# Patient Record
Sex: MALE | Race: WHITE | NOT HISPANIC OR LATINO | Employment: FULL TIME | URBAN - METROPOLITAN AREA
[De-identification: names, ages, dates, MRNs, and addresses within clinical notes are randomized per-mention and may not be internally consistent; named-entity substitution may affect disease eponyms.]

---

## 2017-01-31 ENCOUNTER — ALLSCRIPTS OFFICE VISIT (OUTPATIENT)
Dept: OTHER | Facility: OTHER | Age: 57
End: 2017-01-31

## 2017-04-19 ENCOUNTER — GENERIC CONVERSION - ENCOUNTER (OUTPATIENT)
Dept: OTHER | Facility: OTHER | Age: 57
End: 2017-04-19

## 2017-04-20 ENCOUNTER — GENERIC CONVERSION - ENCOUNTER (OUTPATIENT)
Dept: OTHER | Facility: OTHER | Age: 57
End: 2017-04-20

## 2017-04-20 LAB — INTERPRETATION (HISTORICAL): NORMAL

## 2017-05-24 ENCOUNTER — ALLSCRIPTS OFFICE VISIT (OUTPATIENT)
Dept: OTHER | Facility: OTHER | Age: 57
End: 2017-05-24

## 2018-01-12 VITALS
HEART RATE: 76 BPM | TEMPERATURE: 98.9 F | WEIGHT: 245 LBS | BODY MASS INDEX: 32.47 KG/M2 | HEIGHT: 73 IN | RESPIRATION RATE: 16 BRPM | DIASTOLIC BLOOD PRESSURE: 86 MMHG | SYSTOLIC BLOOD PRESSURE: 122 MMHG

## 2018-01-13 VITALS
BODY MASS INDEX: 33 KG/M2 | HEIGHT: 73 IN | DIASTOLIC BLOOD PRESSURE: 80 MMHG | RESPIRATION RATE: 16 BRPM | SYSTOLIC BLOOD PRESSURE: 126 MMHG | WEIGHT: 249 LBS | HEART RATE: 66 BPM | TEMPERATURE: 97.3 F

## 2018-01-15 NOTE — PROGRESS NOTES
Assessment    1  Abnormal glucose (790 29) (R73 09)   2  BMI 33 0-33 9,adult (V85 33) (Z68 33)   3  Encounter for preventive health examination (V70 0) (Z00 00)   4  Obesity (278 00) (E66 9)   5  Generalized osteoarthritis (715 00) (M15 9)   6  Colon cancer screening (V76 51) (Z12 11)    Plan  Abnormal glucose    · (1) COMPREHENSIVE METABOLIC PANEL; Status:Active; Requested for:37Ttc2228;    · (1) HEMOGLOBIN A1C; Status:Active; Requested for:24Hst2159; Abnormal glucose, Obesity    · (1) LIPID PANEL FASTING W DIRECT LDL REFLEX; Status:Active; Requested  for:78Ftt3997;   Obesity    · Begin a limited exercise program ; Status:Complete;   Done: 80TMZ1534   · Eat a low fat and low cholesterol diet ; Status:Complete;   Done: 55JVH1751   · Shared Decision Making Aid given; Status:Complete;   Done: 68YJD9901   · Some eating tips that can help you lose weight ; Status:Complete;   Done: 45RUV4648   · We recommend that you bring your body mass index down to 26 ; Status:Complete;    Done: 67OCA1285    Chief Complaint  pt present for CPE  ac/cma      History of Present Illness  HM, Adult Male: The patient is being seen for a health maintenance evaluation  General Health: The patient's health since the last visit is described as good  Immunizations status: up to date  Lifestyle:  He consumes a diverse and healthy diet  He has weight concerns  He does not use tobacco    Screening:      Review of Systems    Cardiovascular: no chest pain  Respiratory: no shortness of breath  Gastrointestinal: no abdominal pain and no blood in stools  Musculoskeletal: arthralgias and joint stiffness, but no limb swelling  Active Problems    1  Abnormal glucose (790 29) (R73 09)   2  Allergic rhinitis (477 9) (J30 9)   3  BMI 33 0-33 9,adult (V85 33) (Z68 33)   4  Colon cancer screening (V76 51) (Z12 11)   5  Depression screening (V79 0) (Z13 89)   6  Immunization due (V05 9) (Z23)   7  Obesity (278 00) (E66 9)   8   Prostate cancer screening (V76 44) (Z12 5)   9  Screening for diabetes mellitus (DM) (V77 1) (Z13 1)   10  Screening for lipid disorders (V77 91) (Z13 220)   11  Testicular hypogonadism (257 2) (E29 1)   12  Thyroid disorder screening (V77 0) (Z13 29)   13  Tinea cruris (110 3) (B35 6)   14  Well adult on routine health check (V70 0) (Z00 00)    Past Medical History    · History of Acute maxillary sinusitis, recurrence not specified (461 0) (J01 00)   · History of Blood pressure elevated (401 9) (I10)   · History of acute bronchitis (V12 69) (Z87 09)   · History of acute pharyngitis (V12 69) (Z87 09)   · History of acute sinusitis (V12 69) (Z87 09)   · History of Lyme disease (V12 09) (Z86 19)   · History of Insect bites, initial encounter (919 4,E906 4) (W57 XXXA)   · History of Multiple Nonvenomous Insect Bites (919 4)   · History of Screening for diabetes mellitus (DM) (V77 1) (Z13 1)   · History of Tinea corporis (110 5) (B35 4)    Surgical History    · History of Hip Surgery    Family History  Mother    · Family history of No Significant Family History  Father    · Family history of Coronary Artery Disease (V17 49)   · Family history of Hypertension (V17 49)  Family History    · Denied: Family history of Adverse Reaction To Anesthesia   · Denied: Family history of Benign Polyps Of The Large Intestine   · Denied: Family history of Bleeding   · Denied: Family history of Cancer   · Denied: Family history of Crohn's Disease   · Denied: Family history of Ulcerative Colitis    Social History    · Denied: History of Being A Social Drinker   · Denied: History of Drug Use   · Marital History - Currently    · Never A Smoker   · Occupation:   ·     Allergies    1   No Known Drug Allergies    Vitals   Recorded: 54JPJ1328 98:80AI   Systolic 088   Diastolic 80   Heart Rate 68   Respiration 16   Temperature 95 2 F   Height 6 ft 1 in   Weight 250 lb    BMI Calculated 32 98   BSA Calculated 2 36     Physical Exam    Constitutional General appearance: No acute distress, well appearing and well nourished  Eyes   Conjunctiva and lids: No erythema, swelling or discharge  Pupils and irises: Equal, round, reactive to light  Ophthalmoscopic examination: Normal fundi and optic discs  Ears, Nose, Mouth, and Throat   External inspection of ears and nose: Normal     Otoscopic examination: Tympanic membranes translucent with normal light reflex  Canals patent without erythema  Hearing: Normal     Nasal mucosa, septum, and turbinates: Normal without edema or erythema  Lips, teeth, and gums: Normal, good dentition  Oropharynx: Normal with no erythema, edema, exudate or lesions  Neck   Neck: Supple, symmetric, trachea midline, no masses  Thyroid: Normal, no thyromegaly  Pulmonary   Respiratory effort: No increased work of breathing or signs of respiratory distress  Auscultation of lungs: Clear to auscultation  Cardiovascular   Palpation of heart: Normal PMI, no thrills  Auscultation of heart: Normal rate and rhythm, normal S1 and S2, no murmurs  Carotid pulses: 2+ bilaterally  Pedal pulses: 2+ bilaterally  Examination of extremities for edema and/or varicosities: Normal     Abdomen   Abdomen: Non-tender, no masses  Liver and spleen: No hepatomegaly or splenomegaly  Examination for hernias: No hernias appreciated  Anus, perineum, and rectum: Normal sphincter tone, no masses, no prolapse  Genitourinary   Scrotal contents: Normal testes, no masses  Penis: Normal, no lesions  Digital rectal exam of prostate: Normal size, no masses  Lymphatic   Palpation of lymph nodes in neck: No lymphadenopathy  Palpation of lymph nodes in groin: No lymphadenopathy  Musculoskeletal   Gait and station: Normal     Inspection/palpation of digits and nails: Normal without clubbing or cyanosis      Inspection/palpation of joints, bones, and muscles: Normal     Range of motion: Abnormal   left knee stiffness  Stability: Normal     Muscle strength/tone: Normal     Skin   Skin and subcutaneous tissue: Normal without rashes or lesions  Palpation of skin and subcutaneous tissue: Normal turgor  Neurologic   Reflexes: 2+ and symmetric  Sensation: No sensory loss  Psychiatric   Mood and affect: Normal        Results/Data  PHQ-2 Adult Depression Screening [id number] 09:00PM Damaris Austin     Test Name Result Flag Reference   PHQ-2 Adult Depression Score 0     Over the last two weeks, how often have you been bothered by any of the following problems? Little interest or pleasure in doing things: Not at all - 0  Feeling down, depressed, or hopeless: Not at all - 0   PHQ-2 Adult Depression Screening Negative         Procedure    Procedure: Visual Acuity Test    Indication: routine screening  Inforrmation supplied by a Snellen chart     Results: 20/20 in both eyes with corrective device, 20/25 in the right eye with corrective device, 20/25 in the left eye with corrective device Pt forgot his new prescription glasses but had his old ones during the exam  ac/cma   Color vision was screened with the ANT VILLAGE Test and the results were normal       Provider Comments  Provider Comments:   labwork in 6m to see if needs tx or just monitoring      Signatures   Electronically signed by : Garry Duane, DO; Aug 30 2016  9:03PM EST                       (Author)

## 2018-01-16 NOTE — RESULT NOTES
Discussion/Summary   Sugar is in Diabetes range but kidneys, minerals and liver are normal   You are still under the cut-off to start medications for Diabetes at this time  Your cholesterol is high  Please schedule an appointment to discuss starting cholesterol medications for the purpose of reducing your 10-year risk of heart disease and/or stroke if you are willing    Dr Jessica Lozoya           Verified Results  (1) COMPREHENSIVE METABOLIC PANEL 61TTX7625 90:85TJ Luz Maria Pelt     Test Name Result Flag Reference   Glucose, Serum 133 mg/dL H 65-99   BUN 15 mg/dL  6-24   Creatinine, Serum 0 76 mg/dL  0 76-1 27   eGFR If NonAfricn Am 102 mL/min/1 73  >59   eGFR If Africn Am 118 mL/min/1 73  >59   BUN/Creatinine Ratio 20  9-20   Sodium, Serum 141 mmol/L  134-144   Potassium, Serum 4 8 mmol/L  3 5-5 2   Chloride, Serum 101 mmol/L     Carbon Dioxide, Total 24 mmol/L  18-29   Calcium, Serum 8 9 mg/dL  8 7-10 2   Protein, Total, Serum 6 5 g/dL  6 0-8 5   Albumin, Serum 4 4 g/dL  3 5-5 5   Globulin, Total 2 1 g/dL  1 5-4 5   A/G Ratio 2 1  1 2-2 2   Bilirubin, Total 0 5 mg/dL  0 0-1 2   Alkaline Phosphatase, S 51 IU/L     AST (SGOT) 17 IU/L  0-40   ALT (SGPT) 21 IU/L  0-44     (1) HEMOGLOBIN A1C 19Apr2017 07:39AM Luz Maria Pelt     Test Name Result Flag Reference   Hemoglobin A1c 6 6 % H 4 8-5 6   Pre-diabetes: 5 7 - 6 4           Diabetes: >6 4           Glycemic control for adults with diabetes: <7 0     (1) LIPID PANEL FASTING W DIRECT LDL REFLEX 19Apr2017 07:39AM Luz Maria Pelt     Test Name Result Flag Reference   Cholesterol, Total 188 mg/dL  100-199   Triglycerides 130 mg/dL  0-149   HDL Cholesterol 50 mg/dL  >39   LDL Cholesterol Calc 112 mg/dL H 0-99

## 2018-03-01 LAB
ALBUMIN SERPL-MCNC: 4.2 G/DL (ref 3.5–5.5)
ALBUMIN/GLOB SERPL: 2.1 {RATIO} (ref 1.2–2.2)
ALP SERPL-CCNC: 54 IU/L (ref 39–117)
ALT SERPL-CCNC: 25 IU/L (ref 0–44)
AST SERPL-CCNC: 24 IU/L (ref 0–40)
BILIRUB SERPL-MCNC: 0.5 MG/DL (ref 0–1.2)
BUN SERPL-MCNC: 14 MG/DL (ref 6–24)
BUN/CREAT SERPL: 17 (ref 9–20)
CALCIUM SERPL-MCNC: 9 MG/DL (ref 8.7–10.2)
CHLORIDE SERPL-SCNC: 100 MMOL/L (ref 96–106)
CHOLEST SERPL-MCNC: 191 MG/DL (ref 100–199)
CO2 SERPL-SCNC: 24 MMOL/L (ref 18–29)
CREAT SERPL-MCNC: 0.84 MG/DL (ref 0.76–1.27)
GLOBULIN SER-MCNC: 2 G/DL (ref 1.5–4.5)
GLUCOSE SERPL-MCNC: 163 MG/DL (ref 65–99)
HBA1C MFR BLD: 7.6 % (ref 4.8–5.6)
HDLC SERPL-MCNC: 45 MG/DL
LDLC SERPL CALC-MCNC: 116 MG/DL (ref 0–99)
MICRODELETION SYND BLD/T FISH: NORMAL
POTASSIUM SERPL-SCNC: 4.3 MMOL/L (ref 3.5–5.2)
PROT SERPL-MCNC: 6.2 G/DL (ref 6–8.5)
PSA SERPL-MCNC: 1.4 NG/ML (ref 0–4)
SL AMB EGFR AFRICAN AMERICAN: 112 ML/MIN/1.73
SL AMB EGFR NON AFRICAN AMERICAN: 97 ML/MIN/1.73
SODIUM SERPL-SCNC: 140 MMOL/L (ref 134–144)
TRIGL SERPL-MCNC: 149 MG/DL (ref 0–149)

## 2018-07-09 ENCOUNTER — OFFICE VISIT (OUTPATIENT)
Dept: FAMILY MEDICINE CLINIC | Facility: CLINIC | Age: 58
End: 2018-07-09
Payer: COMMERCIAL

## 2018-07-09 VITALS
WEIGHT: 242 LBS | DIASTOLIC BLOOD PRESSURE: 80 MMHG | SYSTOLIC BLOOD PRESSURE: 130 MMHG | RESPIRATION RATE: 18 BRPM | BODY MASS INDEX: 30.09 KG/M2 | TEMPERATURE: 97.2 F | HEIGHT: 75 IN | HEART RATE: 76 BPM

## 2018-07-09 DIAGNOSIS — K29.00 ACUTE GASTRITIS, PRESENCE OF BLEEDING UNSPECIFIED, UNSPECIFIED GASTRITIS TYPE: Primary | ICD-10-CM

## 2018-07-09 DIAGNOSIS — R73.09 ABNORMAL GLUCOSE: ICD-10-CM

## 2018-07-09 PROCEDURE — 99213 OFFICE O/P EST LOW 20 MIN: CPT | Performed by: FAMILY MEDICINE

## 2018-07-09 PROCEDURE — 1036F TOBACCO NON-USER: CPT | Performed by: FAMILY MEDICINE

## 2018-07-09 PROCEDURE — 3008F BODY MASS INDEX DOCD: CPT | Performed by: FAMILY MEDICINE

## 2018-07-09 RX ORDER — OMEPRAZOLE 40 MG/1
40 CAPSULE, DELAYED RELEASE ORAL DAILY
Qty: 90 CAPSULE | Refills: 0 | Status: SHIPPED | OUTPATIENT
Start: 2018-07-09 | End: 2019-02-08

## 2018-07-09 RX ORDER — ELECTROLYTES/DEXTROSE
1 SOLUTION, ORAL ORAL DAILY
COMMUNITY
Start: 2014-12-03 | End: 2019-05-08 | Stop reason: ALTCHOICE

## 2018-07-09 NOTE — PROGRESS NOTES
Assessment/Plan:    Problem List Items Addressed This Visit     Abnormal glucose     He has lost weight and would like his sugar rechecked  He is trying to avoid medication  Relevant Orders    Comprehensive metabolic panel    Hemoglobin A1C      Other Visit Diagnoses     Acute gastritis, presence of bleeding unspecified, unspecified gastritis type    -  Primary    new, if he doesn't improve will follow up with GI    Relevant Medications    omeprazole (PriLOSEC) 40 MG capsule          Patient Instructions   Follow up with gastroenterologist if not improving      Return if symptoms worsen or fail to improve  Subjective:      Patient ID: Maxime Diop is a 62 y o  male  Chief Complaint   Patient presents with    Abdominal Pain     Since Thursday 07/05/2018  regular BM's  abdomen feels bloated  nauseated no vomiting   pain level up to 8 at times  rmklpn       He has been having upper abdominal pain for the past few days  The pain has woken him up at night  The pain has been intermittent  Then the today the pain is worse  He describes it as an ache  He tried an [address] with no relief  He was told 30 years ago that he had a hiatal hernia  He took a course of Prilosec and felt better  He has been eating less and eating healthier  The following portions of the patient's history were reviewed and updated as appropriate:  past social history    Review of Systems   Constitutional: Negative for fever  Gastrointestinal: Positive for abdominal pain and nausea  Negative for blood in stool, constipation and diarrhea  Current Outpatient Prescriptions   Medication Sig Dispense Refill    Multiple Vitamins-Minerals (MULTIVITAMIN ADULT) TABS Take 1 tablet by mouth daily      omeprazole (PriLOSEC) 40 MG capsule Take 1 capsule (40 mg total) by mouth daily 90 capsule 0     No current facility-administered medications for this visit          Objective:    /80   Pulse 76   Temp (!) 97 2 °F (36 2 °C)   Resp 18   Ht 6' 2 5" (1 892 m)   Wt 110 kg (242 lb)   BMI 30 66 kg/m²        Physical Exam   Constitutional: He appears well-developed and well-nourished  HENT:   Head: Normocephalic and atraumatic  Right Ear: External ear normal    Left Ear: External ear normal    Mouth/Throat: Oropharynx is clear and moist    Cardiovascular: Normal rate, regular rhythm and normal heart sounds  No murmur heard  Pulmonary/Chest: Effort normal and breath sounds normal  No respiratory distress  He has no wheezes  He has no rales  Abdominal: Soft  Bowel sounds are normal  There is tenderness (epigastric)  There is no rebound and no guarding  Musculoskeletal: He exhibits no edema or tenderness  Nursing note and vitals reviewed               Saba Grace DO

## 2019-02-06 ENCOUNTER — TELEPHONE (OUTPATIENT)
Dept: FAMILY MEDICINE CLINIC | Facility: CLINIC | Age: 59
End: 2019-02-06

## 2019-02-06 LAB
ALBUMIN SERPL-MCNC: 4.4 G/DL (ref 3.5–5.5)
ALBUMIN/GLOB SERPL: 2 {RATIO} (ref 1.2–2.2)
ALP SERPL-CCNC: 49 IU/L (ref 39–117)
ALT SERPL-CCNC: 28 IU/L (ref 0–44)
AST SERPL-CCNC: 22 IU/L (ref 0–40)
BILIRUB SERPL-MCNC: 0.3 MG/DL (ref 0–1.2)
BUN SERPL-MCNC: 17 MG/DL (ref 6–24)
BUN/CREAT SERPL: 21 (ref 9–20)
CALCIUM SERPL-MCNC: 9.4 MG/DL (ref 8.7–10.2)
CHLORIDE SERPL-SCNC: 103 MMOL/L (ref 96–106)
CO2 SERPL-SCNC: 26 MMOL/L (ref 20–29)
CREAT SERPL-MCNC: 0.81 MG/DL (ref 0.76–1.27)
GLOBULIN SER-MCNC: 2.2 G/DL (ref 1.5–4.5)
GLUCOSE SERPL-MCNC: 140 MG/DL (ref 65–99)
HBA1C MFR BLD: 7 % (ref 4.8–5.6)
LABCORP COMMENT: NORMAL
POTASSIUM SERPL-SCNC: 4.3 MMOL/L (ref 3.5–5.2)
PROT SERPL-MCNC: 6.6 G/DL (ref 6–8.5)
SL AMB EGFR AFRICAN AMERICAN: 113 ML/MIN/1.73
SL AMB EGFR NON AFRICAN AMERICAN: 98 ML/MIN/1.73
SODIUM SERPL-SCNC: 142 MMOL/L (ref 134–144)

## 2019-02-06 NOTE — TELEPHONE ENCOUNTER
----- Message from Mitch Piedra DO sent at 2/6/2019  1:19 PM EST -----  Please ask him to schedule a follow up appointment to discuss his lab work with either me or Dr Eldon Piedra DO

## 2019-02-08 ENCOUNTER — OFFICE VISIT (OUTPATIENT)
Dept: FAMILY MEDICINE CLINIC | Facility: CLINIC | Age: 59
End: 2019-02-08
Payer: COMMERCIAL

## 2019-02-08 VITALS
HEIGHT: 75 IN | SYSTOLIC BLOOD PRESSURE: 130 MMHG | TEMPERATURE: 96.4 F | HEART RATE: 60 BPM | DIASTOLIC BLOOD PRESSURE: 90 MMHG | BODY MASS INDEX: 31.31 KG/M2 | WEIGHT: 251.8 LBS | RESPIRATION RATE: 18 BRPM

## 2019-02-08 DIAGNOSIS — Z12.5 PROSTATE CANCER SCREENING: ICD-10-CM

## 2019-02-08 DIAGNOSIS — Z13.1 SCREENING FOR DIABETES MELLITUS (DM): ICD-10-CM

## 2019-02-08 DIAGNOSIS — Z13.89 SCREENING FOR CARDIOVASCULAR, RESPIRATORY, AND GENITOURINARY DISEASES: ICD-10-CM

## 2019-02-08 DIAGNOSIS — Z00.00 HEALTHCARE MAINTENANCE: Primary | ICD-10-CM

## 2019-02-08 DIAGNOSIS — Z13.83 SCREENING FOR CARDIOVASCULAR, RESPIRATORY, AND GENITOURINARY DISEASES: ICD-10-CM

## 2019-02-08 DIAGNOSIS — Z13.6 SCREENING FOR CARDIOVASCULAR, RESPIRATORY, AND GENITOURINARY DISEASES: ICD-10-CM

## 2019-02-08 DIAGNOSIS — R73.09 ABNORMAL GLUCOSE: ICD-10-CM

## 2019-02-08 PROCEDURE — 99396 PREV VISIT EST AGE 40-64: CPT | Performed by: FAMILY MEDICINE

## 2019-02-08 NOTE — PROGRESS NOTES
Assessment/Plan:    No problem-specific Assessment & Plan notes found for this encounter  cpe today  bmi advised  DM criteria reviewed, he will work hard to see if he can avoid medication in the near future     Diagnoses and all orders for this visit:    Healthcare maintenance    Screening for cardiovascular, respiratory, and genitourinary diseases  -     Lipid Panel with Direct LDL reflex; Future    Screening for diabetes mellitus (DM)    Prostate cancer screening  -     PSA, Total Screen; Future    Abnormal glucose  -     Comprehensive metabolic panel; Future  -     Hemoglobin A1C; Future        BMI Counseling: Body mass index is 31 9 kg/m²  Discussed the patient's BMI with him  The BMI is above average  BMI counseling and education was provided to the patient  Nutrition recommendations include decreasing overall calorie intake  Return if symptoms worsen or fail to improve  Subjective:      Patient ID: Sol Neil is a 62 y o  male  Chief Complaint   Patient presents with    Follow-up     on blood work akrma        HPI    Labs reviewed  Glucose 140  a1c 7 0  Works a   No exercise  Can do more with diet    The following portions of the patient's history were reviewed and updated as appropriate: allergies, current medications, past family history, past medical history, past social history, past surgical history and problem list     Review of Systems   Respiratory: Negative for shortness of breath  Cardiovascular: Negative for chest pain  Neurological: Negative for dizziness  Current Outpatient Prescriptions   Medication Sig Dispense Refill    Multiple Vitamins-Minerals (MULTIVITAMIN ADULT) TABS Take 1 tablet by mouth daily       No current facility-administered medications for this visit          Objective:    /90   Pulse 60   Temp (!) 96 4 °F (35 8 °C)   Resp 18   Ht 6' 2 5" (1 892 m)   Wt 114 kg (251 lb 12 8 oz)   BMI 31 90 kg/m²        Physical Exam Constitutional: He appears well-developed  No distress  HENT:   Head: Normocephalic  Mouth/Throat: No oropharyngeal exudate  Eyes: Conjunctivae are normal  No scleral icterus  Neck: Neck supple  No thyromegaly present  Cardiovascular: Normal rate and intact distal pulses  No murmur heard  Pulmonary/Chest: Effort normal  No respiratory distress  He has no wheezes  He has no rales  Abdominal: Soft  He exhibits no distension and no mass  There is no tenderness  There is no rebound and no guarding  Hernia confirmed negative in the right inguinal area and confirmed negative in the left inguinal area  Genitourinary: Prostate normal and penis normal  No penile tenderness  Musculoskeletal: He exhibits no edema or deformity  Lymphadenopathy:     He has no cervical adenopathy  Neurological: He is alert  He displays normal reflexes  He exhibits normal muscle tone  Skin: Skin is warm and dry  No rash noted  No pallor  Psychiatric: His behavior is normal  Thought content normal    Nursing note and vitals reviewed               Moreno Hart DO

## 2019-04-14 ENCOUNTER — HOSPITAL ENCOUNTER (EMERGENCY)
Facility: HOSPITAL | Age: 59
Discharge: HOME | End: 2019-04-14
Attending: EMERGENCY MEDICINE | Admitting: EMERGENCY MEDICINE
Payer: COMMERCIAL

## 2019-04-14 ENCOUNTER — APPOINTMENT (EMERGENCY)
Dept: RADIOLOGY | Facility: HOSPITAL | Age: 59
End: 2019-04-14
Payer: COMMERCIAL

## 2019-04-14 VITALS
RESPIRATION RATE: 18 BRPM | SYSTOLIC BLOOD PRESSURE: 140 MMHG | DIASTOLIC BLOOD PRESSURE: 70 MMHG | TEMPERATURE: 98.8 F | OXYGEN SATURATION: 98 % | HEART RATE: 76 BPM

## 2019-04-14 DIAGNOSIS — L02.415 ABSCESS OF RIGHT KNEE: Primary | ICD-10-CM

## 2019-04-14 PROCEDURE — 99283 EMERGENCY DEPT VISIT LOW MDM: CPT

## 2019-04-14 PROCEDURE — 87070 CULTURE OTHR SPECIMN AEROBIC: CPT | Performed by: EMERGENCY MEDICINE

## 2019-04-14 PROCEDURE — 73564 X-RAY EXAM KNEE 4 OR MORE: CPT

## 2019-04-14 PROCEDURE — 87205 SMEAR GRAM STAIN: CPT | Performed by: EMERGENCY MEDICINE

## 2019-04-14 RX ORDER — SULFAMETHOXAZOLE AND TRIMETHOPRIM 800; 160 MG/1; MG/1
1 TABLET ORAL 2 TIMES DAILY
Qty: 14 TABLET | Refills: 0 | Status: SHIPPED | OUTPATIENT
Start: 2019-04-14 | End: 2019-04-21

## 2019-04-14 RX ORDER — LIDOCAINE HYDROCHLORIDE AND EPINEPHRINE 20; 5 MG/ML; UG/ML
10 INJECTION, SOLUTION EPIDURAL; INFILTRATION; INTRACAUDAL; PERINEURAL ONCE
Status: COMPLETED | OUTPATIENT
Start: 2019-04-14 | End: 2019-04-14

## 2019-04-14 RX ORDER — SULFAMETHOXAZOLE AND TRIMETHOPRIM 800; 160 MG/1; MG/1
1 TABLET ORAL ONCE
Status: COMPLETED | OUTPATIENT
Start: 2019-04-14 | End: 2019-04-14

## 2019-04-14 RX ADMIN — LIDOCAINE HYDROCHLORIDE,EPINEPHRINE BITARTRATE 10 ML: 20; .005 INJECTION, SOLUTION EPIDURAL; INFILTRATION; INTRACAUDAL; PERINEURAL at 09:06

## 2019-04-14 RX ADMIN — SULFAMETHOXAZOLE AND TRIMETHOPRIM 1 TABLET: 800; 160 TABLET ORAL at 09:57

## 2019-04-15 ENCOUNTER — VBI (OUTPATIENT)
Dept: FAMILY MEDICINE CLINIC | Facility: CLINIC | Age: 59
End: 2019-04-15

## 2019-04-16 LAB
BACTERIA WND AEROBE CULT: ABNORMAL
GRAM STN SPEC: ABNORMAL
GRAM STN SPEC: ABNORMAL

## 2019-05-08 ENCOUNTER — OFFICE VISIT (OUTPATIENT)
Dept: FAMILY MEDICINE CLINIC | Facility: CLINIC | Age: 59
End: 2019-05-08
Payer: COMMERCIAL

## 2019-05-08 VITALS
HEART RATE: 82 BPM | SYSTOLIC BLOOD PRESSURE: 130 MMHG | TEMPERATURE: 96.5 F | HEIGHT: 74 IN | BODY MASS INDEX: 31.44 KG/M2 | RESPIRATION RATE: 16 BRPM | WEIGHT: 245 LBS | DIASTOLIC BLOOD PRESSURE: 78 MMHG

## 2019-05-08 DIAGNOSIS — L03.115 CELLULITIS OF RIGHT LOWER EXTREMITY: Primary | ICD-10-CM

## 2019-05-08 PROBLEM — M51.26 DISPLACEMENT OF LUMBAR INTERVERTEBRAL DISC WITHOUT MYELOPATHY: Status: ACTIVE | Noted: 2019-05-08

## 2019-05-08 PROBLEM — M54.50 LOW BACK PAIN: Status: ACTIVE | Noted: 2019-05-08

## 2019-05-08 PROBLEM — M16.9 OSTEOARTHRITIS OF HIP: Status: ACTIVE | Noted: 2019-05-08

## 2019-05-08 PROBLEM — M16.10 PRIMARY LOCALIZED OSTEOARTHRITIS OF PELVIC REGION AND THIGH: Status: ACTIVE | Noted: 2019-05-08

## 2019-05-08 PROBLEM — M54.17 LUMBOSACRAL RADICULITIS: Status: ACTIVE | Noted: 2019-05-08

## 2019-05-08 PROCEDURE — 99213 OFFICE O/P EST LOW 20 MIN: CPT | Performed by: FAMILY MEDICINE

## 2019-05-08 PROCEDURE — 3008F BODY MASS INDEX DOCD: CPT | Performed by: FAMILY MEDICINE

## 2019-05-08 PROCEDURE — 1036F TOBACCO NON-USER: CPT | Performed by: FAMILY MEDICINE

## 2019-05-08 RX ORDER — SULFAMETHOXAZOLE AND TRIMETHOPRIM 800; 160 MG/1; MG/1
1 TABLET ORAL 2 TIMES DAILY
Qty: 40 TABLET | Refills: 0 | Status: SHIPPED | OUTPATIENT
Start: 2019-05-08 | End: 2019-05-28

## 2019-05-08 RX ORDER — CEPHALEXIN 500 MG/1
CAPSULE ORAL
COMMUNITY
End: 2019-05-08 | Stop reason: ALTCHOICE

## 2019-05-08 RX ORDER — OMEPRAZOLE 20 MG/1
20 CAPSULE, DELAYED RELEASE ORAL DAILY
Refills: 0 | COMMUNITY
Start: 2019-03-30 | End: 2019-05-08 | Stop reason: ALTCHOICE

## 2020-07-30 ENCOUNTER — APPOINTMENT (EMERGENCY)
Dept: RADIOLOGY | Facility: HOSPITAL | Age: 60
End: 2020-07-30
Payer: COMMERCIAL

## 2020-07-30 ENCOUNTER — HOSPITAL ENCOUNTER (EMERGENCY)
Facility: HOSPITAL | Age: 60
Discharge: HOME | End: 2020-07-30
Attending: EMERGENCY MEDICINE | Admitting: EMERGENCY MEDICINE
Payer: COMMERCIAL

## 2020-07-30 VITALS
WEIGHT: 248 LBS | RESPIRATION RATE: 18 BRPM | DIASTOLIC BLOOD PRESSURE: 88 MMHG | OXYGEN SATURATION: 98 % | BODY MASS INDEX: 31.84 KG/M2 | SYSTOLIC BLOOD PRESSURE: 139 MMHG | HEART RATE: 64 BPM | TEMPERATURE: 96.8 F
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
## 2020-07-30 LAB
BILIRUB UR QL STRIP: NEGATIVE
CLARITY UR: CLEAR
COLOR UR: ABNORMAL
GLUCOSE UR STRIP-MCNC: ABNORMAL MG/DL
HGB UR QL STRIP.AUTO: NEGATIVE
KETONES UR STRIP-MCNC: NEGATIVE MG/DL
LEUKOCYTE ESTERASE UR QL STRIP: NEGATIVE
NITRITE UR QL STRIP: NEGATIVE
PH UR STRIP.AUTO: 6 [PH]
PROT UR STRIP-MCNC: NEGATIVE MG/DL
SP GR UR STRIP.AUTO: <=1.005 (ref 1–1.03)
UROBILINOGEN UR QL STRIP.AUTO: 0.2 E.U./DL

## 2020-07-30 PROCEDURE — 81003 URINALYSIS AUTO W/O SCOPE: CPT | Performed by: EMERGENCY MEDICINE

## 2020-07-30 PROCEDURE — 74177 CT ABD & PELVIS W/CONTRAST: CPT

## 2020-07-30 PROCEDURE — 99284 EMERGENCY DEPT VISIT MOD MDM: CPT | Performed by: EMERGENCY MEDICINE

## 2020-07-30 PROCEDURE — 99284 EMERGENCY DEPT VISIT MOD MDM: CPT

## 2020-07-30 PROCEDURE — 73502 X-RAY EXAM HIP UNI 2-3 VIEWS: CPT

## 2020-07-30 RX ORDER — GINSENG 100 MG
1 CAPSULE ORAL ONCE
Status: COMPLETED | OUTPATIENT
Start: 2020-07-30 | End: 2020-07-30

## 2020-07-30 RX ADMIN — IOHEXOL 100 ML: 350 INJECTION, SOLUTION INTRAVENOUS at 19:13

## 2020-07-30 RX ADMIN — BACITRACIN 1 SMALL APPLICATION: 500 OINTMENT TOPICAL at 18:07

## 2020-07-30 NOTE — ED PROVIDER NOTES
History  Chief Complaint   Patient presents with   Itzel Karimetamika states he was about 3/4 up an eight foot ladder(backwards on the ladder) when ladder kicked out and he fell on top of the ladder, has pain in middle of back , right hip and states has a cut on right foot unable to visualize at triage     77-year-old male states he was about 3/4 way up in a for ladder and he fell backwards off the ladder when the ladder fell he landed on the ladder on his right hip  Patient states he feels he tried to twist himself around before hitting and now feels some tenderness and pain in the left flank region also  States that pain radiates around to the front feels that it is either a lower rib or muscles in his back  No loss of consciousness no neck pain no chest abdomen pain  Patient does have some tenderness around the right hip  But he does have full range of motion prior to me getting in the room without hip  Patient also sustained a small superficial laceration in the area of the right Achilles tendon which did not affect function of the tendon but does have abrasion small amount of bleeding to that area  No other complaints      History provided by:  Patient and spouse   used: No        None       History reviewed  No pertinent past medical history  Past Surgical History:   Procedure Laterality Date    HIP SURGERY Right        History reviewed  No pertinent family history  I have reviewed and agree with the history as documented  E-Cigarette/Vaping    E-Cigarette Use Never User      E-Cigarette/Vaping Substances     Social History     Tobacco Use    Smoking status: Never Smoker    Smokeless tobacco: Never Used   Substance Use Topics    Alcohol use: Yes     Alcohol/week: 1 0 standard drinks     Types: 1 Cans of beer per week     Binge frequency: Monthly    Drug use: No       Review of Systems   Constitutional: Negative for activity change, chills, diaphoresis and fever     HENT: Negative for congestion, ear pain, nosebleeds, sore throat, trouble swallowing and voice change  Eyes: Negative for pain, discharge and redness  Respiratory: Negative for apnea, cough, choking, shortness of breath, wheezing and stridor  Cardiovascular: Negative for chest pain and palpitations  Gastrointestinal: Negative for abdominal distention, abdominal pain, constipation, diarrhea, nausea and vomiting  Endocrine: Negative for polydipsia  Genitourinary: Negative for difficulty urinating, dysuria, flank pain, frequency, hematuria and urgency  Musculoskeletal: Positive for arthralgias and back pain  Negative for gait problem, joint swelling, myalgias, neck pain and neck stiffness  Skin: Negative for pallor and rash  Neurological: Negative for dizziness, tremors, syncope, speech difficulty, weakness, numbness and headaches  Hematological: Negative for adenopathy  Psychiatric/Behavioral: Negative for confusion, hallucinations, self-injury and suicidal ideas  The patient is not nervous/anxious  Physical Exam  Physical Exam   Constitutional: He is oriented to person, place, and time  Vital signs are normal  He appears well-developed and well-nourished  No distress  HENT:   Head: Normocephalic and atraumatic  Right Ear: External ear normal    Left Ear: External ear normal    Nose: Nose normal    Mouth/Throat: Oropharynx is clear and moist    Eyes: Pupils are equal, round, and reactive to light  Conjunctivae are normal    Neck: Normal range of motion  Neck supple  Cardiovascular: Normal rate, regular rhythm, normal heart sounds and intact distal pulses  Pulmonary/Chest: Effort normal and breath sounds normal    Abdominal: Soft  Bowel sounds are normal    Musculoskeletal: Normal range of motion  He exhibits tenderness  Patient has tenderness and the right ankle from the superficial laceration over the Achilles    Patient also some tenderness in the right hip but does have range of motion there  Patient also has pain tenderness to palpation in the left flank along the upper lumbar lower thoracic paravertebral musculature however does radiate around to the front of the abdomen therefore going to CT scan his abdomen and pelvis  Neurological: He is alert and oriented to person, place, and time  He has normal reflexes  Skin: Skin is warm and dry  He is not diaphoretic  Psychiatric: He has a normal mood and affect  Nursing note and vitals reviewed  Vital Signs  ED Triage Vitals [07/30/20 1724]   Temperature Pulse Respirations Blood Pressure SpO2   (!) 96 8 °F (36 °C) 68 18 143/81 97 %      Temp Source Heart Rate Source Patient Position - Orthostatic VS BP Location FiO2 (%)   Tympanic Monitor Sitting Right arm --      Pain Score       --           Vitals:    07/30/20 1800 07/30/20 1815 07/30/20 1830 07/30/20 1930   BP:  134/68  139/88   Pulse: 66 68 82 64   Patient Position - Orthostatic VS:    Sitting         Visual Acuity      ED Medications  Medications   bacitracin topical ointment 1 small application (1 small application Topical Given 7/30/20 1807)   iohexol (OMNIPAQUE) 350 MG/ML injection (MULTI-DOSE) 100 mL (100 mL Intravenous Given 7/30/20 1913)       Diagnostic Studies  Results Reviewed     Procedure Component Value Units Date/Time    UA (URINE) with reflex to Scope [[id number]]  (Abnormal) Collected:  07/30/20 1927    Lab Status:  Final result Specimen:  Urine, Other Updated:  07/30/20 1933     Color, UA Light Yellow     Clarity, UA Clear     Specific Gravity, UA <=1 005     pH, UA 6 0     Leukocytes, UA Negative     Nitrite, UA Negative     Protein, UA Negative mg/dl      Glucose,  (1/10%) mg/dl      Ketones, UA Negative mg/dl      Urobilinogen, UA 0 2 E U /dl      Bilirubin, UA Negative     Blood, UA Negative                 CT abdomen pelvis with contrast   Final Result by Aysha Diallo MD (07/30 1943)      No acute abnormality within the abdomen or pelvis  Workstation performed: [id number]         XR hip/pelv 2-3 vws right    (Results Pending)              Procedures  Procedures         ED Course       US AUDIT      Most Recent Value   Initial Alcohol Screen: US AUDIT-C    1  How often do you have a drink containing alcohol? 2 Filed at: 07/30/2020 1725   2  How many drinks containing alcohol do you have on a typical day you are drinking? 0 Filed at: 07/30/2020 1725   Audit-C Score  2 Filed at: 07/30/2020 1725                  RAMANDEEP/DAST-10      Most Recent Value   How many times in the past year have you    Used an illegal drug or used a prescription medication for non-medical reasons? Never Filed at: 07/30/2020 1725                                MDM      Disposition  Final diagnoses:   Fall, initial encounter     Time reflects when diagnosis was documented in both MDM as applicable and the Disposition within this note     Time User Action Codes Description Comment    7/30/2020  7:59 PM Quynh Walls Add [V45  Jess Gaines, initial encounter       ED Disposition     ED Disposition Condition Date/Time Comment    Discharge Stable Thu Jul 30, 2020  7:59 PM Emerson Storey discharge to home/self care  Follow-up Information     Follow up With Specialties Details Why [address] [facility],  Family Medicine Schedule an appointment as soon as possible for a visit  As needed [address]  [phone]            Patient's Medications    No medications on file     No discharge procedures on file      PDMP Review     None          ED Provider  Electronically Signed by           Ximena Gloria DO  07/30/20 2000

## 2020-07-31 ENCOUNTER — VBI (OUTPATIENT)
Dept: FAMILY MEDICINE CLINIC | Facility: CLINIC | Age: 60
End: 2020-07-31

## 2020-07-31 NOTE — LETTER
[facility]  [address] [facility]  [address]  [place] [address]    Date: 08/03/20  Tess Hamlin  [address] [address]    Dear Jose Juan Pierce:                                                                                                                              Thank you for choosing [facility] emergency department for care  As your primary care provider we want to make sure that your ongoing medical care is being addressed  If you require follow up care as a result of your emergency department visit, there are a few things we would like you to know  As part of our continuing commitment to caring for our patient, we have added more same day appointments and have extended our office hours to meet your medical needs  After hours, on-call physicians are available via our main office line  We encourage you to contact our office prior to seeking treatment to discuss your symptoms with our medical staff  Together, we can determine the correct course of action  A majority of non-emergent conditions such as: common cold, flu-like symptoms, fevers, strains/sprains, dislocations, minor burns, cuts and animal bites can be treated at [facility] facilities  Diagnostic testing is available at some sites  Of course, if you are experiencing a life threatening medical emergency call 911 or proceed directly to the nearest emergency room      Your nearest [facility] facility is conveniently located at:    [facility], [facility]    [phone]    SKIP THE WAIT  Conveniently offered at most [facility] locations  Cynthiafort your spot online at www [facility] [email or web address] or on the [facility] 67    Sincerely,    [address]  Dept: [phone]

## 2020-08-03 NOTE — TELEPHONE ENCOUNTER
Shaylee [place]    ED Visit Information     Ed visit date: 7/30/20  Diagnosis Description: fall  In Network? Yes [address] [facility]  Discharge status: Home  Discharged with meds ? Yes  Number of ED visits to date: 2  ED Severity:n/a    Outreach Information    Outreach successful: Yes 2  Date letter mailed:8/3/2020  Date Finalized:8/3/2020    Care Coordination    Follow up appointment with pcp: no letter mailed  Transportation issues ?  No    Value [employer] Sharan

## 2021-02-05 ENCOUNTER — TELEPHONE (OUTPATIENT)
Dept: FAMILY MEDICINE CLINIC | Facility: CLINIC | Age: 61
End: 2021-02-05

## 2021-02-05 DIAGNOSIS — Z13.83 SCREENING FOR CARDIOVASCULAR, RESPIRATORY, AND GENITOURINARY DISEASES: ICD-10-CM

## 2021-02-05 DIAGNOSIS — Z13.1 SCREENING FOR DIABETES MELLITUS (DM): ICD-10-CM

## 2021-02-05 DIAGNOSIS — R73.09 ABNORMAL GLUCOSE: Primary | ICD-10-CM

## 2021-02-05 DIAGNOSIS — Z12.5 PROSTATE CANCER SCREENING: ICD-10-CM

## 2021-02-05 DIAGNOSIS — Z13.89 SCREENING FOR CARDIOVASCULAR, RESPIRATORY, AND GENITOURINARY DISEASES: ICD-10-CM

## 2021-02-05 DIAGNOSIS — Z13.6 SCREENING FOR CARDIOVASCULAR, RESPIRATORY, AND GENITOURINARY DISEASES: ICD-10-CM

## 2021-02-05 PROBLEM — L03.115 CELLULITIS OF RIGHT LOWER EXTREMITY: Status: RESOLVED | Noted: 2019-05-08 | Resolved: 2021-02-05

## 2021-02-05 NOTE — TELEPHONE ENCOUNTER
Guy Chowdhury has physical with Dr Elsa García  Can we order routine blood work and mail to patient

## 2021-02-13 PROBLEM — M17.11 PRIMARY OSTEOARTHRITIS OF RIGHT KNEE: Status: ACTIVE | Noted: 2021-01-06

## 2021-02-26 LAB
ALBUMIN SERPL-MCNC: 4.4 G/DL (ref 3.8–4.9)
ALBUMIN/GLOB SERPL: 2.3 {RATIO} (ref 1.2–2.2)
ALP SERPL-CCNC: 64 IU/L (ref 39–117)
ALT SERPL-CCNC: 26 IU/L (ref 0–44)
AST SERPL-CCNC: 19 IU/L (ref 0–40)
BILIRUB SERPL-MCNC: 0.5 MG/DL (ref 0–1.2)
BUN SERPL-MCNC: 16 MG/DL (ref 8–27)
BUN/CREAT SERPL: 18 (ref 10–24)
CALCIUM SERPL-MCNC: 9.4 MG/DL (ref 8.6–10.2)
CHLORIDE SERPL-SCNC: 100 MMOL/L (ref 96–106)
CHOLEST SERPL-MCNC: 208 MG/DL (ref 100–199)
CO2 SERPL-SCNC: 25 MMOL/L (ref 20–29)
CREAT SERPL-MCNC: 0.91 MG/DL (ref 0.76–1.27)
GLOBULIN SER-MCNC: 1.9 G/DL (ref 1.5–4.5)
GLUCOSE SERPL-MCNC: 215 MG/DL (ref 65–99)
HBA1C MFR BLD: 10.3 % (ref 4.8–5.6)
HDLC SERPL-MCNC: 49 MG/DL
LDLC SERPL CALC-MCNC: 142 MG/DL (ref 0–99)
MICRODELETION SYND BLD/T FISH: NORMAL
POTASSIUM SERPL-SCNC: 4.3 MMOL/L (ref 3.5–5.2)
PROT SERPL-MCNC: 6.3 G/DL (ref 6–8.5)
PSA SERPL-MCNC: 1.3 NG/ML (ref 0–4)
SL AMB EGFR AFRICAN AMERICAN: 106 ML/MIN/1.73
SL AMB EGFR NON AFRICAN AMERICAN: 91 ML/MIN/1.73
SODIUM SERPL-SCNC: 139 MMOL/L (ref 134–144)
TRIGL SERPL-MCNC: 97 MG/DL (ref 0–149)

## 2021-03-02 ENCOUNTER — OFFICE VISIT (OUTPATIENT)
Dept: FAMILY MEDICINE CLINIC | Facility: CLINIC | Age: 61
End: 2021-03-02
Payer: COMMERCIAL

## 2021-03-02 VITALS
RESPIRATION RATE: 16 BRPM | TEMPERATURE: 98.1 F | DIASTOLIC BLOOD PRESSURE: 80 MMHG | HEART RATE: 78 BPM | SYSTOLIC BLOOD PRESSURE: 136 MMHG | HEIGHT: 74 IN | BODY MASS INDEX: 31.57 KG/M2 | WEIGHT: 246 LBS

## 2021-03-02 DIAGNOSIS — Z00.00 HEALTHCARE MAINTENANCE: Primary | ICD-10-CM

## 2021-03-02 DIAGNOSIS — R73.09 ABNORMAL GLUCOSE: ICD-10-CM

## 2021-03-02 PROCEDURE — 99396 PREV VISIT EST AGE 40-64: CPT | Performed by: FAMILY MEDICINE

## 2021-03-02 PROCEDURE — 3725F SCREEN DEPRESSION PERFORMED: CPT | Performed by: FAMILY MEDICINE

## 2021-03-02 NOTE — PROGRESS NOTES
Assessment/Plan:    No problem-specific Assessment & Plan notes found for this encounter  cpe    DM2 criteria and a1c meaning advised, he declines meds at this time and wants to see what he can do in 3m with significant lifestyle changes and recheck, appt if no better, janae if a1c is over 8     Diagnoses and all orders for this visit:    Healthcare maintenance    Abnormal glucose  -     Comprehensive metabolic panel; Future  -     Hemoglobin A1C; Future  -     Microalbumin / creatinine urine ratio; Future        Return in about 1 year (around 3/2/2022) for Recheck  Subjective:      Patient ID: Miki Sabillon is a 61 y o  male  Chief Complaint   Patient presents with    Well Check     CPE ss,lpn       HPI  Labs reviewed  Used a glucometer borrowed  155, 199, 200s  Eating a lot fruit  Not much sweets or potatoes  Wt mostly unchanged    Thinks he can lose 20#  Drinks water  Unsweetened ice tea  stevia    Plans to limit bread  Walking more and plans to do more still    The following portions of the patient's history were reviewed and updated as appropriate: allergies, current medications, past family history, past medical history, past social history, past surgical history and problem list     Review of Systems   Respiratory: Negative for shortness of breath  Cardiovascular: Negative for chest pain  Genitourinary: Negative for frequency  No current outpatient medications on file  No current facility-administered medications for this visit  Objective:    /80   Pulse 78   Temp 98 1 °F (36 7 °C)   Resp 16   Ht 6' 1 5" (1 867 m)   Wt 112 kg (246 lb)   BMI 32 02 kg/m²        Physical Exam  Vitals signs and nursing note reviewed  Constitutional:       Appearance: He is well-developed  He is obese  He is not ill-appearing  HENT:      Head: Normocephalic  Right Ear: Tympanic membrane normal       Left Ear: Tympanic membrane normal    Eyes:      General: No scleral icterus  Conjunctiva/sclera: Conjunctivae normal    Neck:      Musculoskeletal: Neck supple  Cardiovascular:      Rate and Rhythm: Normal rate and regular rhythm  Heart sounds: No murmur  Pulmonary:      Effort: Pulmonary effort is normal  No respiratory distress  Abdominal:      General: There is no distension  Palpations: Abdomen is soft  Tenderness: There is no abdominal tenderness  Genitourinary:     Penis: Normal        Scrotum/Testes: Normal       Prostate: Normal       Rectum: Normal    Musculoskeletal:         General: No deformity or signs of injury  Skin:     General: Skin is warm and dry  Coloration: Skin is not pale  Neurological:      Mental Status: He is alert  Motor: No weakness  Gait: Gait normal    Psychiatric:         Behavior: Behavior normal          Thought Content: Thought content normal        BMI Counseling: Body mass index is 32 02 kg/m²  The BMI is above normal  Nutrition recommendations include decreasing portion sizes and moderation in carbohydrate intake  Exercise recommendations include exercising 3-5 times per week  No pharmacotherapy was ordered                  Jak Zamora DO

## 2021-03-12 ENCOUNTER — APPOINTMENT (OUTPATIENT)
Dept: RADIOLOGY | Facility: CLINIC | Age: 61
End: 2021-03-12
Payer: COMMERCIAL

## 2021-03-12 ENCOUNTER — OFFICE VISIT (OUTPATIENT)
Dept: OBGYN CLINIC | Facility: CLINIC | Age: 61
End: 2021-03-12
Payer: COMMERCIAL

## 2021-03-12 VITALS
WEIGHT: 240 LBS | DIASTOLIC BLOOD PRESSURE: 76 MMHG | SYSTOLIC BLOOD PRESSURE: 144 MMHG | HEART RATE: 58 BPM | BODY MASS INDEX: 30.8 KG/M2 | HEIGHT: 74 IN

## 2021-03-12 DIAGNOSIS — M79.644 PAIN OF RIGHT THUMB: ICD-10-CM

## 2021-03-12 DIAGNOSIS — M65.4 DE QUERVAIN'S TENOSYNOVITIS, RIGHT: Primary | ICD-10-CM

## 2021-03-12 PROCEDURE — 73140 X-RAY EXAM OF FINGER(S): CPT

## 2021-03-12 PROCEDURE — 3008F BODY MASS INDEX DOCD: CPT | Performed by: ORTHOPAEDIC SURGERY

## 2021-03-12 PROCEDURE — 99203 OFFICE O/P NEW LOW 30 MIN: CPT | Performed by: ORTHOPAEDIC SURGERY

## 2021-03-12 PROCEDURE — 1036F TOBACCO NON-USER: CPT | Performed by: ORTHOPAEDIC SURGERY

## 2021-03-12 PROCEDURE — 20550 NJX 1 TENDON SHEATH/LIGAMENT: CPT | Performed by: ORTHOPAEDIC SURGERY

## 2021-03-12 RX ORDER — LIDOCAINE HYDROCHLORIDE 5 MG/ML
0.5 INJECTION, SOLUTION INFILTRATION; PERINEURAL
Status: COMPLETED | OUTPATIENT
Start: 2021-03-12 | End: 2021-03-12

## 2021-03-12 RX ORDER — TRIAMCINOLONE ACETONIDE 40 MG/ML
20 INJECTION, SUSPENSION INTRA-ARTICULAR; INTRAMUSCULAR
Status: COMPLETED | OUTPATIENT
Start: 2021-03-12 | End: 2021-03-12

## 2021-03-12 RX ADMIN — LIDOCAINE HYDROCHLORIDE 0.5 ML: 5 INJECTION, SOLUTION INFILTRATION; PERINEURAL at 10:42

## 2021-03-12 RX ADMIN — TRIAMCINOLONE ACETONIDE 20 MG: 40 INJECTION, SUSPENSION INTRA-ARTICULAR; INTRAMUSCULAR at 10:42

## 2021-03-12 NOTE — PROGRESS NOTES
Assessment/Plan:  1  De Quervain's tenosynovitis, right  Hand/upper extremity injection: R extensor compartment 1    Durable Medical Equipment   2  Pain of right thumb  XR thumb right first digit-min 2v       Scribe Attestation    I,:  Yadira Trinidad MA am acting as a scribe while in the presence of the attending physician :       I,:  Tommie Mallory DO personally performed the services described in this documentation    as scribed in my presence :             I discussed with Lyn Agarwal that his signs and symptoms are consistent with de quervain's  Treatment options were discussed in the form of bracing and a steroid injection  He was agreeable to this  He consented and underwent a right de quervain's in the office today without any complications  He was fitted and provided with a thumb spica brace that he may use as needed  He is aware I do like to limit these injections to 3 total  Patient was advised to monitor his blood sugar over the next 5 days as steroid injection can raise blood sugar  He will follow up in 3 months for repeat evaluation  Subjective:   Love Bolton is a 61 y o  male who presents to the office today for evaluation of right thumb pain  He states this has been ongoing for the past month  He denies any known injury or trauma  He notes pain to the radial aspect of his wrist  He has been using an OTC thumb brace  He states the pain can radiate up the arm  He notes increased pain with activity  He denies any numbness or tingling  Review of Systems   Constitutional: Negative for chills and fever  HENT: Negative for drooling and sneezing  Eyes: Negative for redness  Respiratory: Negative for cough and wheezing  Gastrointestinal: Negative for nausea and vomiting  Musculoskeletal: Negative for arthralgias, joint swelling and myalgias  Neurological: Negative for weakness and numbness  Psychiatric/Behavioral: Negative for behavioral problems   The patient is not nervous/anxious  History reviewed  No pertinent past medical history  Past Surgical History:   Procedure Laterality Date    HIP SURGERY Right        History reviewed  No pertinent family history  Social History     Occupational History    Not on file   Tobacco Use    Smoking status: Never Smoker    Smokeless tobacco: Never Used   Substance and Sexual Activity    Alcohol use: Yes     Alcohol/week: 1 0 standard drinks     Types: 1 Cans of beer per week     Binge frequency: Monthly    Drug use: No    Sexual activity: Not on file       No current outpatient medications on file  No Known Allergies    Objective:  Vitals:    03/12/21 0958   BP: 144/76   Pulse: 58       Ortho Exam     Right wrist    TTP 1st dorsal compartment  NTTP thumb CMC  NTTP intersection syndrome  NTTP 2nd dorsal compartment   + finkelstein   Compartments soft  Brisk capillary refill  S/m intact median, radial, and ulnar nerve     Physical Exam  Constitutional:       Appearance: He is well-developed  HENT:      Head: Normocephalic and atraumatic  Eyes:      General:         Right eye: No discharge  Left eye: No discharge  Conjunctiva/sclera: Conjunctivae normal    Neck:      Musculoskeletal: Normal range of motion and neck supple  Cardiovascular:      Rate and Rhythm: Normal rate  Pulmonary:      Effort: Pulmonary effort is normal  No respiratory distress  Musculoskeletal:      Comments: As noted in HPI   Skin:     General: Skin is warm and dry  Neurological:      Mental Status: He is alert and oriented to person, place, and time  Psychiatric:         Behavior: Behavior normal          Thought Content: Thought content normal          Judgment: Judgment normal      Hand/upper extremity injection: R extensor compartment 1  Universal Protocol:  Consent: Verbal consent obtained    Consent given by: patient  Patient identity confirmed: verbally with patient    Supporting Documentation  Indications: pain Procedure Details  Condition:de Quervain's tenosynovitis Site: R extensor compartment 1   Preparation: Patient was prepped and draped in the usual sterile fashion  Needle size: 32 G  Ultrasound guidance: no  Medications administered: 0 5 mL lidocaine 0 5 %; 20 mg triamcinolone acetonide 40 mg/mL    Patient tolerance: patient tolerated the procedure well with no immediate complications  Dressing:  Sterile dressing applied             I have personally reviewed pertinent films in PACS and my interpretation is as follows:x-ray right thumb performed in the office today demonstrates thumb CMC arthritis

## 2021-05-17 ENCOUNTER — TELEPHONE (OUTPATIENT)
Dept: OBGYN CLINIC | Facility: CLINIC | Age: 61
End: 2021-05-17

## 2021-05-17 NOTE — TELEPHONE ENCOUNTER
lmom for patient to call back   Has appt on 6/11/21 at 8:15am , Tess Villarrealt will not be in the office that day and need to reschedule

## 2021-09-03 LAB
ALBUMIN SERPL-MCNC: 4.5 G/DL (ref 3.8–4.8)
ALBUMIN/CREAT UR: <5 MG/G CREAT (ref 0–29)
ALBUMIN/GLOB SERPL: 2.4 {RATIO} (ref 1.2–2.2)
ALP SERPL-CCNC: 53 IU/L (ref 48–121)
ALT SERPL-CCNC: 19 IU/L (ref 0–44)
AST SERPL-CCNC: 18 IU/L (ref 0–40)
BILIRUB SERPL-MCNC: 0.5 MG/DL (ref 0–1.2)
BUN SERPL-MCNC: 20 MG/DL (ref 8–27)
BUN/CREAT SERPL: 22 (ref 10–24)
CALCIUM SERPL-MCNC: 9.3 MG/DL (ref 8.6–10.2)
CHLORIDE SERPL-SCNC: 101 MMOL/L (ref 96–106)
CO2 SERPL-SCNC: 27 MMOL/L (ref 20–29)
CREAT SERPL-MCNC: 0.91 MG/DL (ref 0.76–1.27)
CREAT UR-MCNC: 59.8 MG/DL
GLOBULIN SER-MCNC: 1.9 G/DL (ref 1.5–4.5)
GLUCOSE SERPL-MCNC: 138 MG/DL (ref 65–99)
HBA1C MFR BLD: 6.7 % (ref 4.8–5.6)
MICROALBUMIN UR-MCNC: <3 UG/ML
POTASSIUM SERPL-SCNC: 4.3 MMOL/L (ref 3.5–5.2)
PROT SERPL-MCNC: 6.4 G/DL (ref 6–8.5)
SL AMB EGFR AFRICAN AMERICAN: 105 ML/MIN/1.73
SL AMB EGFR NON AFRICAN AMERICAN: 91 ML/MIN/1.73
SODIUM SERPL-SCNC: 141 MMOL/L (ref 134–144)

## 2021-10-05 ENCOUNTER — OFFICE VISIT (OUTPATIENT)
Dept: FAMILY MEDICINE CLINIC | Facility: CLINIC | Age: 61
End: 2021-10-05
Payer: COMMERCIAL

## 2021-10-05 VITALS
WEIGHT: 232 LBS | HEART RATE: 65 BPM | OXYGEN SATURATION: 98 % | RESPIRATION RATE: 14 BRPM | SYSTOLIC BLOOD PRESSURE: 134 MMHG | BODY MASS INDEX: 29.77 KG/M2 | HEIGHT: 74 IN | TEMPERATURE: 98.3 F | DIASTOLIC BLOOD PRESSURE: 76 MMHG

## 2021-10-05 DIAGNOSIS — R73.09 ABNORMAL GLUCOSE: ICD-10-CM

## 2021-10-05 DIAGNOSIS — R21 RASH: ICD-10-CM

## 2021-10-05 DIAGNOSIS — Z13.1 SCREENING FOR DIABETES MELLITUS (DM): ICD-10-CM

## 2021-10-05 DIAGNOSIS — Z13.83 SCREENING FOR CARDIOVASCULAR, RESPIRATORY, AND GENITOURINARY DISEASES: ICD-10-CM

## 2021-10-05 DIAGNOSIS — Z13.89 SCREENING FOR CARDIOVASCULAR, RESPIRATORY, AND GENITOURINARY DISEASES: ICD-10-CM

## 2021-10-05 DIAGNOSIS — Z12.5 PROSTATE CANCER SCREENING: ICD-10-CM

## 2021-10-05 DIAGNOSIS — Z13.6 SCREENING FOR CARDIOVASCULAR, RESPIRATORY, AND GENITOURINARY DISEASES: ICD-10-CM

## 2021-10-05 DIAGNOSIS — B35.4 TINEA CORPORIS: Primary | ICD-10-CM

## 2021-10-05 DIAGNOSIS — Z12.11 COLON CANCER SCREENING: ICD-10-CM

## 2021-10-05 PROCEDURE — 99213 OFFICE O/P EST LOW 20 MIN: CPT | Performed by: FAMILY MEDICINE

## 2021-10-05 PROCEDURE — 3725F SCREEN DEPRESSION PERFORMED: CPT | Performed by: FAMILY MEDICINE

## 2021-10-05 PROCEDURE — 3008F BODY MASS INDEX DOCD: CPT | Performed by: FAMILY MEDICINE

## 2021-10-05 PROCEDURE — 1036F TOBACCO NON-USER: CPT | Performed by: FAMILY MEDICINE

## 2021-10-05 RX ORDER — CLOTRIMAZOLE AND BETAMETHASONE DIPROPIONATE 10; .64 MG/G; MG/G
CREAM TOPICAL 2 TIMES DAILY
Qty: 45 G | Refills: 0 | Status: SHIPPED | OUTPATIENT
Start: 2021-10-05

## 2021-10-06 PROBLEM — Z12.11 COLON CANCER SCREENING: Status: ACTIVE | Noted: 2021-10-06

## 2021-10-06 PROBLEM — R21 RASH: Status: ACTIVE | Noted: 2021-10-06

## 2021-10-14 ENCOUNTER — TELEPHONE (OUTPATIENT)
Dept: GASTROENTEROLOGY | Facility: CLINIC | Age: 61
End: 2021-10-14

## 2022-01-10 ENCOUNTER — HOSPITAL ENCOUNTER (OUTPATIENT)
Dept: GASTROENTEROLOGY | Facility: AMBULATORY SURGERY CENTER | Age: 62
Discharge: HOME | End: 2022-01-10
Payer: COMMERCIAL

## 2022-01-10 ENCOUNTER — ANESTHESIA (OUTPATIENT)
Dept: GASTROENTEROLOGY | Facility: AMBULATORY SURGERY CENTER | Age: 62
End: 2022-01-10

## 2022-01-10 ENCOUNTER — ANESTHESIA EVENT (OUTPATIENT)
Dept: GASTROENTEROLOGY | Facility: AMBULATORY SURGERY CENTER | Age: 62
End: 2022-01-10

## 2022-01-10 VITALS
OXYGEN SATURATION: 97 % | BODY MASS INDEX: 30.16 KG/M2 | HEIGHT: 74 IN | TEMPERATURE: 97.3 F | DIASTOLIC BLOOD PRESSURE: 76 MMHG | SYSTOLIC BLOOD PRESSURE: 122 MMHG | HEART RATE: 63 BPM | WEIGHT: 235 LBS | RESPIRATION RATE: 18 BRPM

## 2022-01-10 DIAGNOSIS — Z12.11 SCREENING FOR COLON CANCER: ICD-10-CM

## 2022-01-10 PROCEDURE — G0121 COLON CA SCRN NOT HI RSK IND: HCPCS | Performed by: INTERNAL MEDICINE

## 2022-01-10 PROCEDURE — 00812 ANES LWR INTST SCR COLSC: CPT | Performed by: NURSE ANESTHETIST, CERTIFIED REGISTERED

## 2022-01-10 RX ORDER — PROPOFOL 10 MG/ML
INJECTION, EMULSION INTRAVENOUS AS NEEDED
Status: DISCONTINUED | OUTPATIENT
Start: 2022-01-10 | End: 2022-01-10

## 2022-01-10 RX ORDER — SODIUM CHLORIDE 9 MG/ML
30 INJECTION, SOLUTION INTRAVENOUS CONTINUOUS
Status: DISCONTINUED | OUTPATIENT
Start: 2022-01-10 | End: 2022-01-14 | Stop reason: HOSPADM

## 2022-01-10 RX ADMIN — PROPOFOL 50 MG: 10 INJECTION, EMULSION INTRAVENOUS at 09:15

## 2022-01-10 RX ADMIN — PROPOFOL 120 MG: 10 INJECTION, EMULSION INTRAVENOUS at 09:00

## 2022-01-10 RX ADMIN — PROPOFOL 80 MG: 10 INJECTION, EMULSION INTRAVENOUS at 09:01

## 2022-01-10 RX ADMIN — PROPOFOL 20 MG: 10 INJECTION, EMULSION INTRAVENOUS at 09:06

## 2022-01-10 RX ADMIN — PROPOFOL 100 MG: 10 INJECTION, EMULSION INTRAVENOUS at 09:03

## 2022-01-10 RX ADMIN — SODIUM CHLORIDE: 9 INJECTION, SOLUTION INTRAVENOUS at 08:47

## 2022-01-10 RX ADMIN — PROPOFOL 30 MG: 10 INJECTION, EMULSION INTRAVENOUS at 09:11

## 2022-01-10 NOTE — H&P
History and Physical - [facility]  Toy Arnold 64 y o  male MRN: [id number]                  HPI: Toy Arnold is a 64y o  year old male who presents for screening colonoscopy  REVIEW OF SYSTEMS: Per the HPI, and otherwise unremarkable  Historical Information   Past Medical History:   Diagnosis Date    History of low back pain     Osteoarthritis     Tinea corporis      Past Surgical History:   Procedure Laterality Date    COLONOSCOPY      HIP SURGERY Right     WISDOM TOOTH EXTRACTION      x1     Social History   Social History     Substance and Sexual Activity   Alcohol Use Yes    Alcohol/week: 1 0 standard drink    Types: 1 Cans of beer per week     Social History     Substance and Sexual Activity   Drug Use No     Social History     Tobacco Use   Smoking Status Never Smoker   Smokeless Tobacco Never Used     History reviewed  No pertinent family history  Meds/Allergies       Current Outpatient Medications:     ciclopirox (LOPROX) 0 77 % cream    clotrimazole-betamethasone (LOTRISONE) 1-0 05 % cream    Current Facility-Administered Medications:     sodium chloride 0 9 % infusion, 30 mL/hr, Intravenous, Continuous, Continue from Pre-op at 01/10/22 0856    No Known Allergies    Objective     /75   Pulse 67   Temp (!) 97 3 °F (36 3 °C)   Resp 18   Ht 6' 2" (1 88 m)   Wt 107 kg (235 lb)   SpO2 98%   BMI 30 17 kg/m²       PHYSICAL EXAM    Gen: NAD  Head: NCAT  CV: RRR  CHEST: Clear  ABD: soft, NT/ND  EXT: no edema      ASSESSMENT/PLAN:  This is a 64y o  year old male here for colonoscopy, and he is stable and optimized for his procedure

## 2022-01-10 NOTE — DISCHARGE SUMMARY
Discharge Summary - Elayne Rodas 64 y o  male MRN: [id number]    Unit/Bed#:  Encounter: [id number]    Admission Date:  01/10/2022    Admitting Diagnosis: Screening for colon cancer [Z12 11]    HPI:  Screening colonoscopy done    Procedures Performed: No orders of the defined types were placed in this encounter  Summary of Hospital Course: Tolerated procedure well    Significant Findings, Care, Treatment and Services Provided:  Polyp not seen    Complications:  None    Discharge Diagnosis:  Normal colonoscopy  Internal skin tag noted  Medical Problems             Resolved Problems  Date Reviewed: 1/10/2022    None                Condition at Discharge: good         Discharge instructions/Information to patient and family:   See after visit summary for information provided to patient and family  Provisions for Follow-Up Care:  See after visit summary for information related to follow-up care and any pertinent home health orders        PCP: Viky Ramos DO    Disposition: Home

## 2022-01-10 NOTE — INTERVAL H&P NOTE
H&P reviewed  After examining the patient I find no changes in the patients condition since the H&P had been written      Vitals:    01/10/22 0810   BP: 134/75   Pulse: 67   Resp: 18   Temp: (!) 97 3 °F (36 3 °C)   SpO2: 98%

## 2022-01-10 NOTE — DISCHARGE INSTRUCTIONS
Colonoscopy   WHAT YOU NEED TO KNOW:   A colonoscopy is a procedure to examine the inside of your colon (intestine) with a scope  Polyps or tissue growths may have been removed during your colonoscopy  It is normal to feel bloated and to have some abdominal discomfort  You should be passing gas  If you have hemorrhoids or you had polyps removed, you may have a small amount of bleeding  DISCHARGE INSTRUCTIONS:   Seek care immediately if:    You have sudden, severe abdominal pain   You have problems swallowing   You have a large amount of black, sticky bowel movements or blood in your bowel movements   You have sudden trouble breathing   You feel weak, lightheaded, or faint or your heart beats faster than normal for you  Contact your healthcare provider if:    You have a fever and chills   You have nausea or are vomiting   Your abdomen is bloated or feels full and hard   You have abdominal pain   You have black, sticky bowel movements or blood in your bowel movements   You have not had a bowel movement for 3 days after your procedure   You have rash or hives   You have questions or concerns about your procedure  Activity:    Do not lift, strain, or run for 24 hours after your procedure   Rest after your procedure  You have been given medicine to relax you  Do not drive or make important decisions until the day after your procedure  Return to your normal activity as directed   Relieve gas and discomfort from bloating by lying on your right side with a heating pad on your abdomen  You may need to take short walks to help the gas move out  Eat small meals until bloating is relieved  Follow up with your healthcare provider as directed: Write down your questions so you remember to ask them during your visits  If you take a blood thinner, please review the specific instructions from your endoscopist about when you should resume it   These can be found in the Recommendation and Your Medication list sections of this After Visit Summary  High Fiber Diet   WHAT YOU NEED TO KNOW:   What is a high-fiber diet? A high-fiber diet includes foods that have a high amount of fiber  Fiber is the part of fruits, vegetables, and grains that is not broken down by your body  Fiber keeps your bowel movements regular  Fiber can also help lower your cholesterol level, control blood sugar in people with diabetes, and relieve constipation  Fiber can also help you control your weight because it helps you feel full faster  Most adults should eat 25 to 35 grams of fiber each day  Talk to your dietitian or healthcare provider about the amount of fiber you need  What foods are good sources of fiber? · Foods with at least 4 grams of fiber per serving:      ? ? to ½ cup of high-fiber cereal (check the nutrition label on the box)    ? ½ cup of blackberries or raspberries    ? 4 dried prunes    ? 1 cooked artichoke    ? ½ cup of cooked legumes, such as lentils, or red, kidney, and brewster beans    · Foods with 1 to 3 grams of fiber per serving:      ? 1 slice of whole-wheat, pumpernickel, or rye bread    ? ½ cup of cooked brown rice    ? 4 whole-wheat crackers    ? 1 cup of oatmeal    ? ½ cup of cereal with 1 to 3 grams of fiber per serving (check the nutrition label on the box)    ? 1 small piece of fruit, such as an apple, banana, pear, kiwi, or orange    ? 3 dates    ? ½ cup of canned apricots, fruit cocktail, peaches, or pears    ? ½ cup of raw or cooked vegetables, such as carrots, cauliflower, cabbage, spinach, squash, or corn    What are some ways that I can increase fiber in my diet? · Choose brown or wild rice instead of white rice  · Use whole wheat flour in recipes instead of white or all-purpose flour  · Add beans and peas to casseroles or soups  · Choose fresh fruit and vegetables with peels or skins on instead of juices      What other guidelines should I follow? · Add fiber to your diet slowly  You may have abdominal discomfort, bloating, and gas if you add fiber to your diet too quickly  · Drink plenty of liquids as you add fiber to your diet  You may have nausea or develop constipation if you do not drink enough water  Ask how much liquid to drink each day and which liquids are best for you  CARE AGREEMENT:   You have the right to help plan your care  Discuss treatment options with your healthcare provider to decide what care you want to receive  You always have the right to refuse treatment  The above information is an  only  It is not intended as medical advice for individual conditions or treatments  Talk to your doctor, nurse or pharmacist before following any medical regimen to see if it is safe and effective for you  © Copyright [employer] 2021 Information is for End User's use only and may not be sold, redistributed or otherwise used for commercial purposes  All illustrations and images included in CareNotes® are the copyrighted property of A D A M , Inc  or [facility] Thomas Khanna   Hemorrhoids   WHAT YOU NEED TO KNOW:   What are hemorrhoids? Hemorrhoids are swollen blood vessels inside your rectum (internal hemorrhoids) or on your anus (external hemorrhoids)  Sometimes a hemorrhoid may prolapse  This means it extends out of your anus  What increases my risk for hemorrhoids? · Pregnancy or obesity    · Straining or sitting for a long time during bowel movements    · Liver disease    · Weak muscles around the anus caused by older age, rectal surgery, or anal intercourse    · A lack of physical activity    · Chronic diarrhea or constipation    · A low-fiber diet    What are the signs and symptoms of hemorrhoids?    · Pain or itching around your anus or inside your rectum    · Swelling or bumps around your anus    · Bright red blood in your bowel movement, on the toilet paper, or in the toilet bowl    · Tissue bulging out of your anus (prolapsed hemorrhoids)    · Incontinence (poor control over urine or bowel movements)    How are hemorrhoids diagnosed? Your healthcare provider will ask about your symptoms, the foods you eat, and your bowel movements  He or she will examine your anus for external hemorrhoids  You may need the following:  · A digital rectal exam  is a test to check for hemorrhoids  Your healthcare provider will put a gloved finger inside your anus to feel for the hemorrhoids  · An anoscopy  is a test that uses a scope (small tube with a light and camera on the end) to look at your hemorrhoids  How are hemorrhoids treated? Treatment will depend on your symptoms  You may need any of the following:  · Medicines  can help decrease pain and swelling, and soften your bowel movement  The medicine may be a pill, pad, cream, or ointment  · Procedures  may be used to shrink or remove your hemorrhoid  Examples include rubber-band ligation, sclerotherapy, and photocoagulation  These procedures may be done in your healthcare provider's office  Ask your healthcare provider for more information about these procedures  · Surgery  may be needed to shrink or remove your hemorrhoids  How can I manage my symptoms? · Apply ice on your anus for 15 to 20 minutes every hour or as directed  Use an ice pack, or put crushed ice in a plastic bag  Cover it with a towel before you apply it to your anus  Ice helps prevent tissue damage and decreases swelling and pain  · Take a sitz bath  Fill a bathtub with 4 to 6 inches of warm water  You may also use a sitz bath pan that fits inside a toilet bowl  Sit in the sitz bath for 15 minutes  Do this 3 times a day, and after each bowel movement  The warm water can help decrease pain and swelling  · Keep your anal area clean  Gently wash the area with warm water daily  Soap may irritate the area  After a bowel movement, wipe with moist towelettes or wet toilet paper   Dry toilet paper can irritate the area  How can I help prevent hemorrhoids? · Do not strain to have a bowel movement  Do not sit on the toilet too long  These actions can increase pressure on the tissues in your rectum and anus  · Drink plenty of liquids  Liquids can help prevent constipation  Ask how much liquid to drink each day and which liquids are best for you  · Eat a variety of high-fiber foods  Examples include fruits, vegetables, and whole grains  Ask your healthcare provider how much fiber you need each day  You may need to take a fiber supplement  · Exercise as directed  Exercise, such as walking, may make it easier to have a bowel movement  Ask your healthcare provider to help you create an exercise plan  · Do not have anal sex  Anal sex can weaken the skin around your rectum and anus  · Avoid heavy lifting  This can cause straining and increase your risk for another hemorrhoid  When should I seek immediate care? · You have severe pain in your rectum or around your anus  · You have severe pain in your abdomen and you are vomiting  · You have bleeding from your anus that soaks through your underwear  When should I contact my healthcare provider? · You have frequent and painful bowel movements  · Your hemorrhoid looks or feels more swollen than usual      · You do not have a bowel movement for 2 days or more  · You see or feel tissue coming through your anus  · You have questions or concerns about your condition or care  CARE AGREEMENT:   You have the right to help plan your care  Learn about your health condition and how it may be treated  Discuss treatment options with your healthcare providers to decide what care you want to receive  You always have the right to refuse treatment  The above information is an  only  It is not intended as medical advice for individual conditions or treatments   Talk to your doctor, nurse or pharmacist before following any medical regimen to see if it is safe and effective for you  © Copyright [employer] 2021 Information is for End User's use only and may not be sold, redistributed or otherwise used for commercial purposes   All illustrations and images included in CareNotes® are the copyrighted property of A D A M , Inc  or [address] [place], [place] [address]

## 2022-01-10 NOTE — ANESTHESIA PREPROCEDURE EVALUATION
Procedure:  COLONOSCOPY    Relevant Problems   ANESTHESIA (within normal limits)      MUSCULOSKELETAL   (+) Generalized osteoarthritis   (+) Low back pain   (+) Osteoarthritis of hip   (+) Primary localized osteoarthritis of pelvic region and thigh   (+) Primary osteoarthritis of right knee        Physical Exam    Airway    Mallampati score: I  TM Distance: >3 FB  Neck ROM: full     Dental   No notable dental hx     Cardiovascular  Rhythm: regular, Rate: normal, Cardiovascular exam normal    Pulmonary  Pulmonary exam normal Breath sounds clear to auscultation,     Other Findings        Anesthesia Plan  ASA Score- 1     Anesthesia Type- IV sedation with anesthesia with ASA Monitors  Additional Monitors:   Airway Plan:     Comment: Nasal cannula  Plan Factors-Exercise tolerance (METS): >4 METS  Chart reviewed  EKG reviewed  Imaging results reviewed  Existing labs reviewed  Patient summary reviewed  Patient is not a current smoker  Patient not instructed to abstain from smoking on day of procedure  Patient did not smoke on day of surgery  Obstructive sleep apnea risk education given perioperatively  Induction- intravenous  Postoperative Plan-     Informed Consent- Anesthetic plan and risks discussed with patient  I personally reviewed this patient with the CRNA  Discussed and agreed on the Anesthesia Plan with the CRNA  Luis Enrique Adams

## 2022-02-28 ENCOUNTER — RA CDI HCC (OUTPATIENT)
Dept: OTHER | Facility: HOSPITAL | Age: 62
End: 2022-02-28

## 2022-02-28 NOTE — PROGRESS NOTES
[facility]  coding opportunities       Chart reviewed, no opportunity found: CHART REVIEWED, NO OPPORTUNITY FOUND                        Patients insurance company: [employer] (Medicare and [employer] for [employer] and [employer])           [place] [address]  coding opportunities       Chart reviewed, no opportunity found: CHART REVIEWED, NO OPPORTUNITY FOUND                        Patients insurance company: [employer] (Medicare and [employer] for [employer] and [employer])

## 2022-10-12 PROBLEM — Z12.11 COLON CANCER SCREENING: Status: RESOLVED | Noted: 2021-10-06 | Resolved: 2022-10-12

## 2023-01-31 ENCOUNTER — TELEPHONE (OUTPATIENT)
Dept: FAMILY MEDICINE CLINIC | Facility: CLINIC | Age: 63
End: 2023-01-31

## 2023-02-01 DIAGNOSIS — Z13.83 SCREENING FOR CARDIOVASCULAR, RESPIRATORY, AND GENITOURINARY DISEASES: ICD-10-CM

## 2023-02-01 DIAGNOSIS — Z13.6 SCREENING FOR CARDIOVASCULAR, RESPIRATORY, AND GENITOURINARY DISEASES: ICD-10-CM

## 2023-02-01 DIAGNOSIS — R73.09 ABNORMAL GLUCOSE: Primary | ICD-10-CM

## 2023-02-01 DIAGNOSIS — Z12.5 PROSTATE CANCER SCREENING: ICD-10-CM

## 2023-02-01 DIAGNOSIS — Z13.89 SCREENING FOR CARDIOVASCULAR, RESPIRATORY, AND GENITOURINARY DISEASES: ICD-10-CM

## 2023-02-01 DIAGNOSIS — Z13.1 SCREENING FOR DIABETES MELLITUS (DM): ICD-10-CM

## 2023-02-01 NOTE — TELEPHONE ENCOUNTER
Lmom of patients phone letting him know lab work was up front for  and to schedule CPE once labs were done    No further action needed

## 2023-03-02 ENCOUNTER — APPOINTMENT (EMERGENCY)
Dept: RADIOLOGY | Facility: HOSPITAL | Age: 63
End: 2023-03-02

## 2023-03-02 ENCOUNTER — HOSPITAL ENCOUNTER (EMERGENCY)
Facility: HOSPITAL | Age: 63
Discharge: HOME | End: 2023-03-02
Attending: EMERGENCY MEDICINE | Admitting: EMERGENCY MEDICINE

## 2023-03-02 VITALS
SYSTOLIC BLOOD PRESSURE: 187 MMHG | HEART RATE: 71 BPM | OXYGEN SATURATION: 95 % | DIASTOLIC BLOOD PRESSURE: 96 MMHG | WEIGHT: 235 LBS | HEIGHT: 74 IN | BODY MASS INDEX: 30.16 KG/M2 | RESPIRATION RATE: 18 BRPM | TEMPERATURE: 97.4 F

## 2023-03-02 DIAGNOSIS — S01.81XA FACIAL LACERATION, INITIAL ENCOUNTER: Primary | ICD-10-CM

## 2023-03-02 RX ORDER — LIDOCAINE HYDROCHLORIDE AND EPINEPHRINE 10; 10 MG/ML; UG/ML
10 INJECTION, SOLUTION INFILTRATION; PERINEURAL ONCE
Status: COMPLETED | OUTPATIENT
Start: 2023-03-02 | End: 2023-03-02

## 2023-03-02 RX ORDER — GINSENG 100 MG
1 CAPSULE ORAL ONCE
Status: COMPLETED | OUTPATIENT
Start: 2023-03-02 | End: 2023-03-02

## 2023-03-02 RX ADMIN — BACITRACIN 1 SMALL APPLICATION: 500 OINTMENT TOPICAL at 15:54

## 2023-03-02 RX ADMIN — LIDOCAINE HYDROCHLORIDE,EPINEPHRINE BITARTRATE 10 ML: 10; .01 INJECTION, SOLUTION INFILTRATION; PERINEURAL at 15:54

## 2023-03-02 RX ADMIN — TETANUS TOXOID, REDUCED DIPHTHERIA TOXOID AND ACELLULAR PERTUSSIS VACCINE, ADSORBED 0.5 ML: 5; 2.5; 8; 8; 2.5 SUSPENSION INTRAMUSCULAR at 15:54

## 2023-03-02 NOTE — DISCHARGE INSTRUCTIONS
Keep wound dry when bathing  Topical antibiotic and new Band-Aid daily  Suture removal 5 to 7 days at your primary care provider      Return to the ED for signs of wound infection including redness or pus or fever, or for worsening headache or persistent vomiting

## 2023-03-07 LAB
ALBUMIN SERPL-MCNC: 4.3 G/DL (ref 3.8–4.8)
ALBUMIN/CREAT UR: <11 MG/G CREAT (ref 0–29)
ALBUMIN/GLOB SERPL: 2.3 {RATIO} (ref 1.2–2.2)
ALP SERPL-CCNC: 62 IU/L (ref 44–121)
ALT SERPL-CCNC: 16 IU/L (ref 0–44)
AST SERPL-CCNC: 17 IU/L (ref 0–40)
BILIRUB SERPL-MCNC: 0.4 MG/DL (ref 0–1.2)
BUN SERPL-MCNC: 15 MG/DL (ref 8–27)
BUN/CREAT SERPL: 16 (ref 10–24)
CALCIUM SERPL-MCNC: 8.9 MG/DL (ref 8.6–10.2)
CHLORIDE SERPL-SCNC: 101 MMOL/L (ref 96–106)
CHOLEST SERPL-MCNC: 213 MG/DL (ref 100–199)
CO2 SERPL-SCNC: 25 MMOL/L (ref 20–29)
CREAT SERPL-MCNC: 0.91 MG/DL (ref 0.76–1.27)
CREAT UR-MCNC: 26.9 MG/DL
EGFR: 95 ML/MIN/1.73
GLOBULIN SER-MCNC: 1.9 G/DL (ref 1.5–4.5)
GLUCOSE SERPL-MCNC: 170 MG/DL (ref 70–99)
HBA1C MFR BLD: 8 % (ref 4.8–5.6)
HDLC SERPL-MCNC: 47 MG/DL
LDLC SERPL CALC-MCNC: 148 MG/DL (ref 0–99)
MICROALBUMIN UR-MCNC: <3 UG/ML
MICRODELETION SYND BLD/T FISH: NORMAL
POTASSIUM SERPL-SCNC: 4.4 MMOL/L (ref 3.5–5.2)
PROT SERPL-MCNC: 6.2 G/DL (ref 6–8.5)
PSA SERPL-MCNC: 1.5 NG/ML (ref 0–4)
SODIUM SERPL-SCNC: 139 MMOL/L (ref 134–144)
TRIGL SERPL-MCNC: 101 MG/DL (ref 0–149)

## 2023-03-08 ENCOUNTER — OFFICE VISIT (OUTPATIENT)
Dept: FAMILY MEDICINE CLINIC | Facility: CLINIC | Age: 63
End: 2023-03-08

## 2023-03-08 VITALS
SYSTOLIC BLOOD PRESSURE: 154 MMHG | DIASTOLIC BLOOD PRESSURE: 80 MMHG | TEMPERATURE: 97 F | HEART RATE: 82 BPM | BODY MASS INDEX: 31.7 KG/M2 | RESPIRATION RATE: 18 BRPM | WEIGHT: 247 LBS | HEIGHT: 74 IN

## 2023-03-08 DIAGNOSIS — E66.9 OBESITY (BMI 30-39.9): ICD-10-CM

## 2023-03-08 DIAGNOSIS — Z48.02 VISIT FOR SUTURE REMOVAL: ICD-10-CM

## 2023-03-08 DIAGNOSIS — R73.09 ABNORMAL GLUCOSE: Primary | ICD-10-CM

## 2023-03-08 DIAGNOSIS — R03.0 ELEVATED BP WITHOUT DIAGNOSIS OF HYPERTENSION: ICD-10-CM

## 2023-03-08 RX ORDER — TERBINAFINE HYDROCHLORIDE 250 MG/1
250 TABLET ORAL DAILY
COMMUNITY

## 2023-03-08 NOTE — PROGRESS NOTES
Assessment/Plan:    No problem-specific Assessment & Plan notes found for this encounter  Elevated bp aware, wants to try for 1-3m if can control bp to less than <140/90 but f/u if not successful    DM2 level glucose/a1c  Reports has been successful to fix in the past and wants to try again  Given 3m  Has gained 15# recently    Right eye sutures completely removed  Local care advised  F/u prn  Tetanus utd     Diagnoses and all orders for this visit:    Abnormal glucose  -     Comprehensive metabolic panel; Future  -     Hemoglobin A1C; Future    Elevated BP without diagnosis of hypertension    Visit for suture removal    Obesity (BMI 30-39  9)    BMI 31 0-31 9,adult    Other orders  -     terbinafine (LamISIL) 250 mg tablet; Take 250 mg by mouth daily        No follow-ups on file  Subjective:      Patient ID: Norbert Dominguez is a 58 y o  male  Chief Complaint   Patient presents with   • Follow-up     ER f/u, suture removal nm lpn       HPI  Sutures placed in ER  Right lower eyelid  1w  No f/c/dc/pain    Labs reviewed  DM criteria and risks of DM aware    The following portions of the patient's history were reviewed and updated as appropriate: allergies, current medications, past family history, past medical history, past social history, past surgical history and problem list     Review of Systems   Constitutional: Positive for unexpected weight change  Negative for fever  Current Outpatient Medications   Medication Sig Dispense Refill   • ciclopirox (LOPROX) 0 77 % cream      • clotrimazole-betamethasone (LOTRISONE) 1-0 05 % cream Apply topically 2 (two) times a day Short term, sparingly to area: right leg 45 g 0   • terbinafine (LamISIL) 250 mg tablet Take 250 mg by mouth daily       No current facility-administered medications for this visit         Objective:    /80   Pulse 82   Temp (!) 97 °F (36 1 °C)   Resp 18   Ht 6' 2" (1 88 m)   Wt 112 kg (247 lb)   BMI 31 71 kg/m²        Physical Exam  Vitals and nursing note reviewed  Constitutional:       General: He is not in acute distress  Appearance: He is well-developed  He is not ill-appearing  HENT:      Head: Normocephalic  Right Ear: Tympanic membrane normal       Left Ear: Tympanic membrane normal    Eyes:      General: No scleral icterus  Conjunctiva/sclera: Conjunctivae normal    Cardiovascular:      Rate and Rhythm: Normal rate and regular rhythm  Heart sounds: No murmur heard  Pulmonary:      Effort: Pulmonary effort is normal  No respiratory distress  Breath sounds: No wheezing  Abdominal:      Palpations: Abdomen is soft  Musculoskeletal:         General: No deformity  Cervical back: Neck supple  Right lower leg: No edema  Left lower leg: No edema  Skin:     General: Skin is warm and dry  Coloration: Skin is not pale  Neurological:      Mental Status: He is alert  Motor: No weakness  Gait: Gait normal    Psychiatric:         Behavior: Behavior normal          Thought Content: Thought content normal          BMI Counseling: Body mass index is 31 71 kg/m²  The BMI is above normal  Nutrition recommendations include decreasing portion sizes and moderation in carbohydrate intake  Exercise recommendations include exercising 3-5 times per week  No pharmacotherapy was ordered  Rationale for BMI follow-up plan is due to patient being overweight or obese  Depression Screening and Follow-up Plan: Patient was screened for depression during today's encounter  They screened negative with a PHQ-2 score of 0             Heather Patino DO

## 2023-10-10 LAB
ALBUMIN SERPL-MCNC: 4.4 G/DL (ref 3.9–4.9)
ALBUMIN/GLOB SERPL: 2.8 {RATIO} (ref 1.2–2.2)
ALP SERPL-CCNC: 55 IU/L (ref 44–121)
ALT SERPL-CCNC: 17 IU/L (ref 0–44)
AST SERPL-CCNC: 17 IU/L (ref 0–40)
BILIRUB SERPL-MCNC: 0.4 MG/DL (ref 0–1.2)
BUN SERPL-MCNC: 18 MG/DL (ref 8–27)
BUN/CREAT SERPL: 21 (ref 10–24)
CALCIUM SERPL-MCNC: 9.1 MG/DL (ref 8.6–10.2)
CHLORIDE SERPL-SCNC: 103 MMOL/L (ref 96–106)
CO2 SERPL-SCNC: 25 MMOL/L (ref 20–29)
CREAT SERPL-MCNC: 0.86 MG/DL (ref 0.76–1.27)
EGFR: 97 ML/MIN/1.73
GLOBULIN SER-MCNC: 1.6 G/DL (ref 1.5–4.5)
GLUCOSE SERPL-MCNC: 164 MG/DL (ref 70–99)
HBA1C MFR BLD: 7.5 % (ref 4.8–5.6)
POTASSIUM SERPL-SCNC: 4.7 MMOL/L (ref 3.5–5.2)
PROT SERPL-MCNC: 6 G/DL (ref 6–8.5)
SODIUM SERPL-SCNC: 140 MMOL/L (ref 134–144)

## 2024-02-21 PROBLEM — Z13.89 SCREENING FOR CARDIOVASCULAR, RESPIRATORY, AND GENITOURINARY DISEASES: Status: RESOLVED | Noted: 2019-02-08 | Resolved: 2024-02-21

## 2024-02-21 PROBLEM — Z12.5 PROSTATE CANCER SCREENING: Status: RESOLVED | Noted: 2019-02-08 | Resolved: 2024-02-21

## 2024-02-21 PROBLEM — Z13.6 SCREENING FOR CARDIOVASCULAR, RESPIRATORY, AND GENITOURINARY DISEASES: Status: RESOLVED | Noted: 2019-02-08 | Resolved: 2024-02-21

## 2024-02-21 PROBLEM — Z00.00 HEALTHCARE MAINTENANCE: Status: RESOLVED | Noted: 2019-02-08 | Resolved: 2024-02-21

## 2024-02-21 PROBLEM — Z13.83 SCREENING FOR CARDIOVASCULAR, RESPIRATORY, AND GENITOURINARY DISEASES: Status: RESOLVED | Noted: 2019-02-08 | Resolved: 2024-02-21

## 2024-02-21 PROBLEM — Z13.1 SCREENING FOR DIABETES MELLITUS (DM): Status: RESOLVED | Noted: 2019-02-08 | Resolved: 2024-02-21

## 2024-04-07 PROBLEM — R21 RASH: Status: RESOLVED | Noted: 2021-10-06 | Resolved: 2024-04-07

## 2024-04-07 PROBLEM — Z48.02 VISIT FOR SUTURE REMOVAL: Status: RESOLVED | Noted: 2023-03-08 | Resolved: 2024-04-07

## 2024-04-09 DIAGNOSIS — Z13.220 SCREENING FOR LIPID DISORDERS: ICD-10-CM

## 2024-04-09 DIAGNOSIS — R73.09 ABNORMAL GLUCOSE: ICD-10-CM

## 2024-04-09 DIAGNOSIS — Z12.5 PROSTATE CANCER SCREENING: ICD-10-CM

## 2024-04-09 DIAGNOSIS — Z13.1 SCREENING FOR DIABETES MELLITUS (DM): Primary | ICD-10-CM

## 2024-04-18 LAB
ALBUMIN SERPL-MCNC: 4.5 G/DL (ref 3.9–4.9)
ALBUMIN/CREAT UR: <22 MG/G CREAT (ref 0–29)
ALBUMIN/GLOB SERPL: 2.3 {RATIO} (ref 1.2–2.2)
ALP SERPL-CCNC: 61 IU/L (ref 44–121)
ALT SERPL-CCNC: 20 IU/L (ref 0–44)
AST SERPL-CCNC: 19 IU/L (ref 0–40)
BILIRUB SERPL-MCNC: 0.5 MG/DL (ref 0–1.2)
BUN SERPL-MCNC: 20 MG/DL (ref 8–27)
BUN/CREAT SERPL: 23 (ref 10–24)
CALCIUM SERPL-MCNC: 9.1 MG/DL (ref 8.6–10.2)
CHLORIDE SERPL-SCNC: 101 MMOL/L (ref 96–106)
CHOLEST SERPL-MCNC: 192 MG/DL (ref 100–199)
CO2 SERPL-SCNC: 24 MMOL/L (ref 20–29)
CREAT SERPL-MCNC: 0.87 MG/DL (ref 0.76–1.27)
CREAT UR-MCNC: 13.8 MG/DL
EGFR: 97 ML/MIN/1.73
GLOBULIN SER-MCNC: 2 G/DL (ref 1.5–4.5)
GLUCOSE SERPL-MCNC: 177 MG/DL (ref 70–99)
HBA1C MFR BLD: 7.9 % (ref 4.8–5.6)
HDLC SERPL-MCNC: 48 MG/DL
LDLC SERPL CALC-MCNC: 127 MG/DL (ref 0–99)
MICROALBUMIN UR-MCNC: <3 UG/ML
MICRODELETION SYND BLD/T FISH: NORMAL
POTASSIUM SERPL-SCNC: 4.3 MMOL/L (ref 3.5–5.2)
PROT SERPL-MCNC: 6.5 G/DL (ref 6–8.5)
PSA SERPL-MCNC: 2 NG/ML (ref 0–4)
SODIUM SERPL-SCNC: 139 MMOL/L (ref 134–144)
TRIGL SERPL-MCNC: 95 MG/DL (ref 0–149)

## 2024-04-20 PROBLEM — N18.1 CKD (CHRONIC KIDNEY DISEASE) STAGE 1, GFR 90 ML/MIN OR GREATER: Status: ACTIVE | Noted: 2024-04-20

## 2024-04-20 PROBLEM — E78.49 OTHER HYPERLIPIDEMIA: Status: ACTIVE | Noted: 2024-04-20

## 2024-04-22 ENCOUNTER — OFFICE VISIT (OUTPATIENT)
Dept: FAMILY MEDICINE CLINIC | Facility: CLINIC | Age: 64
End: 2024-04-22
Payer: COMMERCIAL

## 2024-04-22 VITALS
BODY MASS INDEX: 31.83 KG/M2 | RESPIRATION RATE: 16 BRPM | WEIGHT: 240.2 LBS | SYSTOLIC BLOOD PRESSURE: 144 MMHG | HEART RATE: 64 BPM | TEMPERATURE: 98.2 F | DIASTOLIC BLOOD PRESSURE: 78 MMHG | HEIGHT: 73 IN

## 2024-04-22 DIAGNOSIS — E11.22 TYPE 2 DIABETES MELLITUS WITH STAGE 1 CHRONIC KIDNEY DISEASE, WITHOUT LONG-TERM CURRENT USE OF INSULIN  (HCC): ICD-10-CM

## 2024-04-22 DIAGNOSIS — I10 PRIMARY HYPERTENSION: ICD-10-CM

## 2024-04-22 DIAGNOSIS — Z00.00 HEALTHCARE MAINTENANCE: Primary | ICD-10-CM

## 2024-04-22 DIAGNOSIS — E66.9 OBESITY (BMI 30-39.9): ICD-10-CM

## 2024-04-22 DIAGNOSIS — N18.1 TYPE 2 DIABETES MELLITUS WITH STAGE 1 CHRONIC KIDNEY DISEASE, WITHOUT LONG-TERM CURRENT USE OF INSULIN  (HCC): ICD-10-CM

## 2024-04-22 DIAGNOSIS — E78.49 OTHER HYPERLIPIDEMIA: ICD-10-CM

## 2024-04-22 DIAGNOSIS — N18.1 CKD (CHRONIC KIDNEY DISEASE) STAGE 1, GFR 90 ML/MIN OR GREATER: ICD-10-CM

## 2024-04-22 PROCEDURE — 99214 OFFICE O/P EST MOD 30 MIN: CPT | Performed by: FAMILY MEDICINE

## 2024-04-22 PROCEDURE — 99396 PREV VISIT EST AGE 40-64: CPT | Performed by: FAMILY MEDICINE

## 2024-04-22 RX ORDER — LISINOPRIL 10 MG/1
10 TABLET ORAL DAILY
Start: 2024-04-22 | End: 2024-04-29 | Stop reason: SDUPTHER

## 2024-04-22 RX ORDER — DULAGLUTIDE 0.75 MG/.5ML
0.75 INJECTION, SOLUTION SUBCUTANEOUS
Qty: 2 ML | Refills: 1 | Status: SHIPPED | OUTPATIENT
Start: 2024-04-22 | End: 2024-04-29

## 2024-04-22 RX ORDER — ATORVASTATIN CALCIUM 10 MG/1
10 TABLET, FILM COATED ORAL DAILY
Start: 2024-04-22 | End: 2024-04-29 | Stop reason: SDUPTHER

## 2024-04-22 NOTE — PROGRESS NOTES
Assessment/Plan:    No problem-specific Assessment & Plan notes found for this encounter.    Cpe    Several issues, separate from prevention, were discussed and addressed today, and therefore coded separately from the Preventative Visit:    Hypertension  New  Goals advised  Suggest lisinopril 10mg/d with f/u 1m  He is hesitant to accept but understands the risks/benefits of the medication and potential complications of the condition especially if left untreated    HLD new  In addition to diet, lipitor was suggested  Also has DM2  He is hesitant to accept but understands the risks/benefits of the medication and potential complications of the condition especially if left untreated    Ckd 1 noted   Stay hydrated    DM2  New   Has been elevated in past but now uncontrolled  In addition to diet and exercise, medication was suggested  He has never been on metformin but interested in trulicity  He is hesitant to accept but understands the risks/benefits of the medication and potential complications of the condition especially if left untreated  F/u 1m with smbg records advised  Test strips advised given    Bmi noted  Obesity advised   Wt loss suggested    We can start medication for Diabetes as listed.    Blood pressure medication is also recommended, namely lisinopril 10mg once a day with a 4 week follow up appointment.    Cholesterol medication is recommended for cholesterol reduction but mostly risk reduction of heart attack/stroke.  Atorvastatin 10mg once daily is a suggestion.    TEST STRIPS for Diabetes monitoring can be prescribed to your pharmacy.  Since the test strips are unique to the BRAND of the glucose meter, it is in your best economic interest to find out the SPECIFIC BRAND and TYPE of test strip covered by your insurance company.  You can find this out from your insurance company or participating pharmacy.  Once you obtain this information, we can prescribe the specific branded test strip of your  choosing.    As a Type 2 Diabetic, it would be informative to check and record your blood sugars at home to help manage sugar control and identify any fluctuations that may be due to diet/exercise.  You can check your sugar ONCE A DAY at FOUR DIFFERENT times in an alternating fashion: (1) before BREAKFAST, then the next day (2) before LUNCH, the following day (3) before DINNER, and the following day (4) before BEDTIME (then start over again).   Identifying trends may help you determine if changes are needed to what you eat, when you eat it, your activity level, and your medications.  You should bring this records with you to your Diabetes follow-up appointments every 3 months.       Diagnoses and all orders for this visit:    Healthcare maintenance    CKD (chronic kidney disease) stage 1, GFR 90 ml/min or greater    Primary hypertension  -     lisinopril (ZESTRIL) 10 mg tablet; Take 1 tablet (10 mg total) by mouth daily    Other hyperlipidemia  -     atorvastatin (LIPITOR) 10 mg tablet; Take 1 tablet (10 mg total) by mouth daily    Type 2 diabetes mellitus with stage 1 chronic kidney disease, without long-term current use of insulin  (HCC)  -     dulaglutide (Trulicity) 0.75 MG/0.5ML injection; Inject 0.5 mL (0.75 mg total) under the skin every 7 days    BMI 32.0-32.9,adult    Obesity (BMI 30-39.9)        Return in about 1 month (around 5/22/2024).    Subjective:      Patient ID: Jayy Wells is a 63 y.o. male.    Chief Complaint   Patient presents with    Physical Exam     YC       HPI  Here for cpe  Can do better with diet  Some wt loss  Criteria for DM2 and HTN and HLD advised    The following portions of the patient's history were reviewed and updated as appropriate: allergies, current medications, past family history, past medical history, past social history, past surgical history and problem list.    Review of Systems   Respiratory:  Negative for shortness of breath.    Cardiovascular:  Negative for chest  "pain.         Current Outpatient Medications   Medication Sig Dispense Refill    atorvastatin (LIPITOR) 10 mg tablet Take 1 tablet (10 mg total) by mouth daily      ciclopirox (LOPROX) 0.77 % cream As needed      clotrimazole-betamethasone (LOTRISONE) 1-0.05 % cream Apply topically 2 (two) times a day Short term, sparingly to area: right leg (Patient taking differently: Apply topically 2 (two) times a day Short term, sparingly to area: right leg; as needed) 45 g 0    dulaglutide (Trulicity) 0.75 MG/0.5ML injection Inject 0.5 mL (0.75 mg total) under the skin every 7 days 2 mL 1    lisinopril (ZESTRIL) 10 mg tablet Take 1 tablet (10 mg total) by mouth daily       No current facility-administered medications for this visit.       Objective:    /78   Pulse 64   Temp 98.2 °F (36.8 °C) (Tympanic)   Resp 16   Ht 6' 0.64\" (1.845 m)   Wt 109 kg (240 lb 3.2 oz)   BMI 32.01 kg/m²        Physical Exam  Vitals and nursing note reviewed.   Constitutional:       General: He is not in acute distress.     Appearance: He is well-developed. He is not ill-appearing.   HENT:      Head: Normocephalic.      Right Ear: Tympanic membrane and ear canal normal.      Left Ear: Tympanic membrane and ear canal normal.   Eyes:      General: No scleral icterus.     Conjunctiva/sclera: Conjunctivae normal.   Neck:      Vascular: No carotid bruit.   Cardiovascular:      Rate and Rhythm: Normal rate and regular rhythm.      Pulses: no weak pulses.           Dorsalis pedis pulses are 2+ on the right side and 2+ on the left side.      Heart sounds: No murmur heard.  Pulmonary:      Effort: Pulmonary effort is normal. No respiratory distress.      Breath sounds: No wheezing.   Abdominal:      General: There is no distension.      Palpations: Abdomen is soft.      Tenderness: There is no abdominal tenderness.   Musculoskeletal:         General: No deformity.      Cervical back: Neck supple.      Right lower leg: No edema.      Left lower " leg: No edema.   Skin:     General: Skin is warm and dry.      Coloration: Skin is not jaundiced or pale.   Neurological:      Mental Status: He is alert.      Motor: No weakness.      Gait: Gait normal.   Psychiatric:         Mood and Affect: Mood normal.         Behavior: Behavior normal.         Thought Content: Thought content normal.       Diabetic Foot Exam    Patient's shoes and socks removed.    Right Foot/Ankle   Right Foot Inspection  Skin Exam: skin intact. No abnormal color.     Sensory   Monofilament testing: intact    Vascular  The right DP pulse is 2+.     Left Foot/Ankle  Left Foot Inspection  Skin Exam: skin intact. Normal color.     Sensory   Monofilament testing: intact    Vascular  The left DP pulse is 2+.     Assign Risk Category  No deformity present  No loss of protective sensation  No weak pulses  Risk: 0           Giovanni Hankins,

## 2024-04-22 NOTE — PATIENT INSTRUCTIONS
We can start medication for Diabetes as listed.    Blood pressure medication is also recommended, namely lisinopril 10mg once a day with a 4 week follow up appointment.    Cholesterol medication is recommended for cholesterol reduction but mostly risk reduction of heart attack/stroke.  Atorvastatin 10mg once daily is a suggestion.    TEST STRIPS for Diabetes monitoring can be prescribed to your pharmacy.  Since the test strips are unique to the BRAND of the glucose meter, it is in your best economic interest to find out the SPECIFIC BRAND and TYPE of test strip covered by your insurance company.  You can find this out from your insurance company or participating pharmacy.  Once you obtain this information, we can prescribe the specific branded test strip of your choosing.    As a Type 2 Diabetic, it would be informative to check and record your blood sugars at home to help manage sugar control and identify any fluctuations that may be due to diet/exercise.  You can check your sugar ONCE A DAY at FOUR DIFFERENT times in an alternating fashion: (1) before BREAKFAST, then the next day (2) before LUNCH, the following day (3) before DINNER, and the following day (4) before BEDTIME (then start over again).   Identifying trends may help you determine if changes are needed to what you eat, when you eat it, your activity level, and your medications.  You should bring this records with you to your Diabetes follow-up appointments every 3 months.

## 2024-04-23 DIAGNOSIS — I10 PRIMARY HYPERTENSION: ICD-10-CM

## 2024-04-23 DIAGNOSIS — E78.49 OTHER HYPERLIPIDEMIA: ICD-10-CM

## 2024-04-23 RX ORDER — LISINOPRIL 10 MG/1
10 TABLET ORAL DAILY
Qty: 90 TABLET | Refills: 0 | Status: CANCELLED | OUTPATIENT
Start: 2024-04-23

## 2024-04-23 RX ORDER — ATORVASTATIN CALCIUM 10 MG/1
10 TABLET, FILM COATED ORAL DAILY
Qty: 90 TABLET | Refills: 0 | Status: CANCELLED | OUTPATIENT
Start: 2024-04-23

## 2024-04-23 NOTE — TELEPHONE ENCOUNTER
Fernando from the pharmacy called stated that pt medication Trulicity needs prior authorization The number to call is [phone] Please Advise Thank you

## 2024-04-23 NOTE — TELEPHONE ENCOUNTER
Patient's wife called that patient thought it over and decided he would rather start with the metformin instead of the Trulicity as see how that goes. If a script can be sent to the mail order service [facility]. They also want the lipitor and zestril sent to the mail order as well.

## 2024-04-23 NOTE — TELEPHONE ENCOUNTER
The patients wife called back the patient will also need the lisinopril and all the testing supplies for the diabetes  the monitor the test strip the lancets she would like this all sent to the mail order [facility]  thank you

## 2024-04-27 ENCOUNTER — TELEPHONE (OUTPATIENT)
Age: 64
End: 2024-04-27

## 2024-04-27 NOTE — TELEPHONE ENCOUNTER
PA for Trulicity 0.75mg/0.5ml     Submitted via    []CMM-KEY   [x][employer]-Case ID # [id number]   []Faxed to plan   []Other website   []Phone call Case ID #     Office notes sent, clinical questions answered. Awaiting determination    Turnaround time for your insurance to make a decision on your Prior Authorization can take 7-21 business days.

## 2024-04-29 DIAGNOSIS — E78.49 OTHER HYPERLIPIDEMIA: ICD-10-CM

## 2024-04-29 DIAGNOSIS — E11.22 TYPE 2 DIABETES MELLITUS WITH STAGE 1 CHRONIC KIDNEY DISEASE, WITHOUT LONG-TERM CURRENT USE OF INSULIN  (HCC): Primary | ICD-10-CM

## 2024-04-29 DIAGNOSIS — I10 PRIMARY HYPERTENSION: ICD-10-CM

## 2024-04-29 DIAGNOSIS — N18.1 TYPE 2 DIABETES MELLITUS WITH STAGE 1 CHRONIC KIDNEY DISEASE, WITHOUT LONG-TERM CURRENT USE OF INSULIN  (HCC): Primary | ICD-10-CM

## 2024-04-29 RX ORDER — ATORVASTATIN CALCIUM 10 MG/1
10 TABLET, FILM COATED ORAL DAILY
Qty: 90 TABLET | Refills: 1 | Status: SHIPPED | OUTPATIENT
Start: 2024-04-29

## 2024-04-29 RX ORDER — METFORMIN HYDROCHLORIDE 500 MG/1
500 TABLET, EXTENDED RELEASE ORAL
Qty: 90 TABLET | Refills: 1 | Status: SHIPPED | OUTPATIENT
Start: 2024-04-29

## 2024-04-29 RX ORDER — ATORVASTATIN CALCIUM 10 MG/1
10 TABLET, FILM COATED ORAL DAILY
Qty: 90 TABLET | Refills: 1 | Status: SHIPPED | OUTPATIENT
Start: 2024-04-29 | End: 2024-04-29 | Stop reason: SDUPTHER

## 2024-04-29 RX ORDER — LISINOPRIL 10 MG/1
10 TABLET ORAL DAILY
Qty: 90 TABLET | Refills: 1 | Status: SHIPPED | OUTPATIENT
Start: 2024-04-29 | End: 2024-04-29 | Stop reason: SDUPTHER

## 2024-04-29 RX ORDER — LISINOPRIL 10 MG/1
10 TABLET ORAL DAILY
Qty: 90 TABLET | Refills: 1 | Status: SHIPPED | OUTPATIENT
Start: 2024-04-29

## 2024-04-29 NOTE — TELEPHONE ENCOUNTER
Medication: lisinopril (ZESTRIL) 10 mg tablet     Dose/Frequency: Take 1 tablet (10 mg total) by mouth daily     Quantity: 60    Pharmacy: [facility] - [address]      Office:   [x] PCP/Provider -   [] Speciality/Provider -     Does the patient have enough for 3 days?   [] Yes   [] No - Send as HP to POD

## 2024-04-29 NOTE — TELEPHONE ENCOUNTER
Pt's wife called inquiring about medications: Metformin was supposed to be prescribed, pt does not want to do the injectables as of now.(Trulicity).  Also, a glucometer w/ strips was also supposed to be ordered and the pharm confirmed that these items and medication has not been sent yet.     Pt would like a return call. Please advise.

## 2024-04-29 NOTE — TELEPHONE ENCOUNTER
PA for Trulicity Approved     Date(s) approved 4- - 4-        Patient advised by          [x] General Sentiment[employer] Message  [] Phone call   []LMOM  []L/M to call office as no active Communication consent on file  []Unable to leave detailed message as VM not approved on Communication consent     Pharmacy advised by    [x]Fax  []Phone call    Approval letter scanned into Media Yes

## 2024-04-29 NOTE — TELEPHONE ENCOUNTER
Medication: atorvastatin (LIPITOR) 10 mg tablet     Dose/Frequency: Take 1 tablet (10 mg total) by mouth daily     Quantity: 60    Pharmacy: [facility] - [address]     Office:   [x] PCP/Provider -   [] Speciality/Provider -     Does the patient have enough for 3 days?   [x] Yes   [] No - Send as HP to POD

## 2024-04-30 NOTE — TELEPHONE ENCOUNTER
Pt's wife called to make Dr Hankins aware their insurance will cover the Contour or the Contour Next glucometer and test strips.  Please send Rxs to [facility].    Also, fyi, she mentioned that [facility] told her the Rxs for lisinopril and atorvastatin had been canceled.  I did not see any documentation to support that.  She will call if there is an issue.

## 2024-04-30 NOTE — TELEPHONE ENCOUNTER
Spoke with patient's wife and she will call [facility] to find out the model of the meter.  ALBINA Skinner CMA

## 2024-05-13 ENCOUNTER — TELEPHONE (OUTPATIENT)
Age: 64
End: 2024-05-13

## 2024-05-13 NOTE — TELEPHONE ENCOUNTER
Pt's wife called concerned about his medication that are not available by [facility].     She has called [facility] and they say the following medication was not sent over.     lisinopril (ZESTRIL) 10 mg tablet     atorvastatin (LIPITOR) 10 mg tablet     Pt was also told they would get a Glucose Meter with test strips and that too was not order.     Pt would like a return call. Please advise.

## 2024-05-14 DIAGNOSIS — E78.49 OTHER HYPERLIPIDEMIA: ICD-10-CM

## 2024-05-14 DIAGNOSIS — I10 PRIMARY HYPERTENSION: ICD-10-CM

## 2024-05-14 RX ORDER — ATORVASTATIN CALCIUM 10 MG/1
10 TABLET, FILM COATED ORAL DAILY
Qty: 90 TABLET | Refills: 1 | Status: SHIPPED | OUTPATIENT
Start: 2024-05-14

## 2024-05-14 RX ORDER — LISINOPRIL 10 MG/1
10 TABLET ORAL DAILY
Qty: 90 TABLET | Refills: 1 | Status: SHIPPED | OUTPATIENT
Start: 2024-05-14

## 2024-05-15 ENCOUNTER — RA CDI HCC (OUTPATIENT)
Dept: OTHER | Facility: HOSPITAL | Age: 64
End: 2024-05-15

## 2024-05-15 NOTE — PROGRESS NOTES
[facility] Dermatology Note  Encounter Date: Mar 29, 2024  Office Visit     Dermatology Problem List:  1. 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 Bx 8/31/21 revealed lichenoid keratosis of the vertex scalp.  - Current tx: ILK10 injections, clobetasol propionate 0.05% shampoo alternating w/ zinc shampoo every other day, fluocinolone oil once weekly,Duoderm to vertex plaque  - Previous tx: plaquenil w/ significant improvement (~15 yrs; discontinued 12/2019 with concerns for retinopathy by local doc in [place] [facility]; now followed by Dr. Radford here without signs of Plaquenil toxicity 1/2020).  - LPPAI score: 3.83 on 3/28/2023, 1 on 5/2/23, 1.33 on 11/07/2023 1.33 3/29/24  2. Facial papules related to FFA   - Current tx: tretinoin 0.025% cream every other day   3. Skin infection, vertex scalp at prior site of MMS - resolved  - s/p doxycycline 100 mg BID and mupirocin ointment BID   4. History of NMSC  - BCC - front vertex scalp, s/p Mohs with purse string and granulation, 3/30/22  - BCC - forehead, s/p MMS 6/9/21  - SCCis - R vertex scalp, s/p MMS 3/2/21, bacterial cx obtained from site 3/23/21  - BCC - right nasal ala s/p Mohs 11/27/17  - Nodular BCC - vertex scalp s/p Mohs 6/5/15  - SCC - scalp, s/p Mohs 4/18/2014  - Hx of 1-2 other NMSC on scalp (BCCs)  5. AKs - bilateral temples, cheeks  6. Inflamed SKs  ____________________________________________    Assessment & Plan:     # Lichen planopilaris, stable clinically but worsening symptoms wise  # Erosive pustular dermatosis, 5 cm x 2.5 cm crusted plaque. Improved.  Overall stable/improved on exam today in terms of hair loss and central plaque scalp appears improved and less crusted compared to prior. Her scalp has less scale and erythema compared to prior visits overall but there is some adherent scale on the front scalp as well as an increase in itching and soreness. Will proceed with ILK injections. Did a higher volume  HCC coding opportunities          Chart Reviewed number of suggestions sent to Provider: 1  E11.65     Patients Insurance        Commercial Insurance: [employer]        than usual (usually 1 mL, today 1.8).  - LPPAI score today: 1.33 (same as last visit)  - Continue clobetasol shampoo but increase to 2x/weekly  - Continue fluocinolone oil 1x weekly  - Fine to continue DuoDerm 2-3 days on 2-3 days off, Vaseline    # Seborrheic keratoses  Benign nature reviewed. None are particularly bothersome but some with an erythematous base. Several on the forehead, photographed today.    Procedures Performed:   - Intra-lesional triamcinolone procedure note. After verbal consent and review of risk of pain and skin thinning/atrophy, positioning and cleansing with isopropyl alcohol, 1.8 total mL of triamcinolone 10 mg/mL was injected into 18 lesion(s) on the scalp. The patient tolerated the procedure well and left the dermatology clinic in good condition.    Follow-up: as scheduled in May, sooner PRN.    Staff and Resident:     Katerine Singh MD  Dermatology Resident PGY4    Patient was seen and examined with the dermatology resident. I agree with the history, review of systems, physical examination, assessments and plan. ILK injections were done together.    Barbara Soto MD  Professor   Department of Dermatology  [facility]   ____________________________________________    CC: Derm Problem (ILK injections- HL about the same. Feeling some burning intermittently. )    HPI:  Ms. Ester Barba is a 77 year old female who presents as a return patient for follow-up of hair loss, diagnosed as LPP and EPD.   - Last seen in-clinic 6 weeks ago at which time she is unsure whether or not we did ILK (not clear in the note). Since this visit, her symptoms have worsened.  - Shedding or thinning, or both: both  - Current tx: ILK, dermasmoothe oil, clobetasol shampoo  - If using Rogaine, 1 cannister lasts how long: N/A   - Scalp or hair care habits/products: shampooing about 3x weekly. Clobetasol shampoo 1x weekly otherwise using zinc shampoo. Dermasmoothe oil 3x weekly. Duoderm to the vertex 1  day on, 1 day off  No Any new medications, supplements, or products? (please list below)     Yes Scalp pain   Yes Scalp burning   Yes Scalp itching    No Eyebrow changes    No Eyelash changes   No Beard changes    No Other body hair changes    No Nail changes    No Additional symptoms? (please list below)     - Overall course: she feels like things are slightly worse/more symptomatic     Patient is otherwise feeling well, in usual state of health, and has no additional skin concerns today.       Labs:  CBC, CMP, vit D, inflammatory markers from 11/2023 unremarkable    Physical Exam:  Vitals: There were no vitals taken for this visit.  GEN: Well developed, well-nourished, in no acute distress, in a pleasant mood.    SKIN: Focused examination of scalp and face was performed.  - Very mild diffuse erythema and in some areas very mild perifollicular scale  - There is an area of adherent scale over the central frontal hairline  - No diffuse scaling of the scalp   - negative hair pull test   - normal eyelash density  - normal eyebrow density  - No scalp folliculitis/pustules   - Plaque on the central vertex scalp appears less inflamed and crusted compared to prior. Tiny small area of crusting on the L posterior aspect  - Waxy stuck on papule on the R postauricular scalp and similar lesions scattered across the forehead  - In comparison to prior photographs, less scaling, erythema and crusting on vertex of scalp and increased overall density.    Medications:  Current Outpatient Medications   Medication    acetaminophen (TYLENOL) 500 MG tablet    Biotin 10 MG TABS tablet    Calcium 1500 MG TABS    Cholecalciferol (VITAMIN D) 1000 UNIT capsule    clobetasol (TEMOVATE) 0.05 % external ointment    clobetasol propionate (CLOBEX) 0.05 % external shampoo    COMPOUNDED NON-CONTROLLED SUBSTANCE (CMPD RX) - PHARMACY TO MIX COMPOUNDED MEDICATION    desonide (DESOWEN) 0.05 % external cream    esomeprazole (NEXIUM) 20 MG packet     Fluocinolone Acetonide Scalp 0.01 % OIL oil    glucosamine-chondroitin 500-400 MG CAPS per capsule    hydrocortisone 2.5 % cream    ibuprofen (ADVIL,MOTRIN) 800 MG tablet    ketoconazole (NIZORAL) 2 % external cream    levalbuterol (XOPENEX HFA) 45 MCG/ACT inhaler    Loratadine (CLARITIN PO)    mupirocin (BACTROBAN) 2 % external ointment    NEW MED    tretinoin (RETIN-A) 0.025 % external cream     Current Facility-Administered Medications   Medication    hydrocortisone 2.5 % cream    lidocaine 1% with EPINEPHrine 1:100,000 injection 3 mL    triamcinolone acetonide (KENALOG-10) injection 10 mg    triamcinolone acetonide (KENALOG-10) injection 10 mg    triamcinolone acetonide (KENALOG-10) injection 10 mg    triamcinolone acetonide (KENALOG-10) injection 10 mg    triamcinolone acetonide (KENALOG-10) injection 17 mg    triamcinolone acetonide (KENALOG-10) injection 17 mg      Past Medical History:   Patient Active Problem List   Diagnosis    Porokeratosis    Squamous cell carcinoma    Neoplasm of uncertain behavior    Lichen planopilaris    Dermatitis    Cicatricial alopecia    Keratosis, inflamed seborrheic    History of skin cancer    Basal cell carcinoma of vertex scalp s/p mohs 6-5-15    Medication management    Actinic keratosis    Medication monitoring encounter    Dermatitis, seborrheic    Erosive pustular dermatosis    Tick bite, initial encounter    Vulvar atrophy    Factor V Leiden mutation (H24)    Splenic artery aneurysm (H24)    Post concussion syndrome    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 on close of this encounter.

## 2024-05-16 NOTE — TELEPHONE ENCOUNTER
LMOM for patient to call back   Okay to give message if patient calls back     Raquel Helton, KITA

## 2024-05-17 NOTE — TELEPHONE ENCOUNTER
Patient returned call, informed medication sent to [facility]. Patient states send meter [facility] strips [facility], patient states whichever machine you think is good. Any questions can be reached at [phone]

## 2024-05-17 NOTE — TELEPHONE ENCOUNTER
At his last visit, I advised:    TEST STRIPS for Diabetes monitoring can be prescribed to your pharmacy. Since the test strips are unique to the BRAND of the glucose meter, it is in your best economic interest to find out the SPECIFIC BRAND and TYPE of test strip covered by your insurance company. You can find this out from your insurance company or participating pharmacy. Once you obtain this information, we can prescribe the specific branded test strip of your choosing.     He needs to let me know what test strip is covered by his plan.

## 2024-05-22 PROBLEM — Z00.00 HEALTHCARE MAINTENANCE: Status: RESOLVED | Noted: 2024-04-22 | Resolved: 2024-05-22

## 2024-05-22 NOTE — TELEPHONE ENCOUNTER
Spoke with pt wife and pt needs a meter to test and strips. Suggested to the pt to contact the ins co to see which machine they cover and then contact us back to let us know which they would like to use.

## 2024-05-23 ENCOUNTER — TELEPHONE (OUTPATIENT)
Age: 64
End: 2024-05-23

## 2024-05-23 DIAGNOSIS — E11.22 TYPE 2 DIABETES MELLITUS WITH STAGE 1 CHRONIC KIDNEY DISEASE, WITHOUT LONG-TERM CURRENT USE OF INSULIN  (HCC): Primary | ICD-10-CM

## 2024-05-23 DIAGNOSIS — N18.1 TYPE 2 DIABETES MELLITUS WITH STAGE 1 CHRONIC KIDNEY DISEASE, WITHOUT LONG-TERM CURRENT USE OF INSULIN  (HCC): Primary | ICD-10-CM

## 2024-05-23 RX ORDER — LANCETS
EACH MISCELLANEOUS
Qty: 100 EACH | Refills: 3 | Status: SHIPPED | OUTPATIENT
Start: 2024-05-23

## 2024-05-23 RX ORDER — BLOOD-GLUCOSE METER
EACH MISCELLANEOUS
Qty: 1 KIT | Refills: 1 | Status: SHIPPED | OUTPATIENT
Start: 2024-05-23

## 2024-05-23 NOTE — TELEPHONE ENCOUNTER
Patients wife called this morning with insurance approved glucose monitoring supplies.  Please write an RX for the Contord Nex glucose monitor, strips and lancets.  Quantitiy 100 for measuring glucose 3 times a day.  This needs to be sent to [facility].

## 2024-06-06 DIAGNOSIS — N18.1 TYPE 2 DIABETES MELLITUS WITH STAGE 1 CHRONIC KIDNEY DISEASE, WITHOUT LONG-TERM CURRENT USE OF INSULIN  (HCC): Primary | ICD-10-CM

## 2024-06-06 DIAGNOSIS — E11.22 TYPE 2 DIABETES MELLITUS WITH STAGE 1 CHRONIC KIDNEY DISEASE, WITHOUT LONG-TERM CURRENT USE OF INSULIN  (HCC): Primary | ICD-10-CM

## 2024-06-06 RX ORDER — BLOOD-GLUCOSE METER
EACH MISCELLANEOUS
Qty: 1 KIT | Refills: 1 | Status: SHIPPED | OUTPATIENT
Start: 2024-06-06

## 2024-10-14 DIAGNOSIS — I10 PRIMARY HYPERTENSION: ICD-10-CM

## 2024-10-15 RX ORDER — LISINOPRIL 10 MG/1
10 TABLET ORAL DAILY
Qty: 90 TABLET | Refills: 1 | Status: SHIPPED | OUTPATIENT
Start: 2024-10-15

## 2024-10-17 DIAGNOSIS — E78.49 OTHER HYPERLIPIDEMIA: ICD-10-CM

## 2024-10-17 RX ORDER — ATORVASTATIN CALCIUM 10 MG/1
10 TABLET, FILM COATED ORAL DAILY
Qty: 90 TABLET | Refills: 1 | Status: SHIPPED | OUTPATIENT
Start: 2024-10-17

## 2025-02-11 ENCOUNTER — OFFICE VISIT (OUTPATIENT)
Dept: FAMILY MEDICINE CLINIC | Facility: CLINIC | Age: 65
End: 2025-02-11
Payer: COMMERCIAL

## 2025-02-11 VITALS
HEART RATE: 76 BPM | SYSTOLIC BLOOD PRESSURE: 136 MMHG | TEMPERATURE: 97.7 F | HEIGHT: 77 IN | DIASTOLIC BLOOD PRESSURE: 84 MMHG | RESPIRATION RATE: 19 BRPM | BODY MASS INDEX: 27.06 KG/M2 | WEIGHT: 229.2 LBS

## 2025-02-11 DIAGNOSIS — I10 PRIMARY HYPERTENSION: Primary | ICD-10-CM

## 2025-02-11 DIAGNOSIS — N18.1 TYPE 2 DIABETES MELLITUS WITH STAGE 1 CHRONIC KIDNEY DISEASE, WITHOUT LONG-TERM CURRENT USE OF INSULIN  (HCC): ICD-10-CM

## 2025-02-11 DIAGNOSIS — Z12.5 PROSTATE CANCER SCREENING: ICD-10-CM

## 2025-02-11 DIAGNOSIS — E11.22 TYPE 2 DIABETES MELLITUS WITH STAGE 1 CHRONIC KIDNEY DISEASE, WITHOUT LONG-TERM CURRENT USE OF INSULIN  (HCC): ICD-10-CM

## 2025-02-11 DIAGNOSIS — N52.9 ERECTILE DYSFUNCTION, UNSPECIFIED ERECTILE DYSFUNCTION TYPE: ICD-10-CM

## 2025-02-11 DIAGNOSIS — N18.1 CKD (CHRONIC KIDNEY DISEASE) STAGE 1, GFR 90 ML/MIN OR GREATER: ICD-10-CM

## 2025-02-11 DIAGNOSIS — E78.49 OTHER HYPERLIPIDEMIA: ICD-10-CM

## 2025-02-11 PROBLEM — E66.9 OBESITY (BMI 30-39.9): Status: RESOLVED | Noted: 2023-03-08 | Resolved: 2025-02-11

## 2025-02-11 LAB
LEFT EYE DIABETIC RETINOPATHY: NORMAL
LEFT EYE IMAGE QUALITY: NORMAL
LEFT EYE MACULAR EDEMA: NORMAL
LEFT EYE OTHER RETINOPATHY: NORMAL
RIGHT EYE DIABETIC RETINOPATHY: NORMAL
RIGHT EYE IMAGE QUALITY: NORMAL
RIGHT EYE MACULAR EDEMA: NORMAL
RIGHT EYE OTHER RETINOPATHY: NORMAL
SEVERITY (EYE EXAM): NORMAL

## 2025-02-11 PROCEDURE — 92250 FUNDUS PHOTOGRAPHY W/I&R: CPT | Performed by: FAMILY MEDICINE

## 2025-02-11 PROCEDURE — 99214 OFFICE O/P EST MOD 30 MIN: CPT | Performed by: FAMILY MEDICINE

## 2025-02-11 RX ORDER — SILDENAFIL 100 MG/1
100 TABLET, FILM COATED ORAL DAILY PRN
Qty: 30 TABLET | Refills: 5 | Status: SHIPPED | OUTPATIENT
Start: 2025-02-11

## 2025-02-11 RX ORDER — LISINOPRIL 10 MG/1
10 TABLET ORAL DAILY
Qty: 100 TABLET | Refills: 1 | Status: SHIPPED | OUTPATIENT
Start: 2025-02-11

## 2025-02-11 RX ORDER — ATORVASTATIN CALCIUM 10 MG/1
10 TABLET, FILM COATED ORAL DAILY
Qty: 100 TABLET | Refills: 1 | Status: SHIPPED | OUTPATIENT
Start: 2025-02-11

## 2025-02-11 NOTE — ASSESSMENT & PLAN NOTE
Goal advised  F/u with [facility] endocrinologist  Lab Results   Component Value Date    HGBA1C 7.9 (H) 04/17/2024       Orders:    IRIS Diabetic eye exam    Comprehensive metabolic panel; Future    Albumin / creatinine urine ratio; Future

## 2025-02-11 NOTE — ASSESSMENT & PLAN NOTE
Stable on ACEi  Orders:    lisinopril (ZESTRIL) 10 mg tablet; Take 1 tablet (10 mg total) by mouth daily

## 2025-02-11 NOTE — PROGRESS NOTES
Name: Jayy Wells      : 1960      MRN: [id number]  Encounter Provider: Giovanni Hankins DO  Encounter Date: 2025   Encounter department: [facility]  :  Assessment & Plan  Type 2 diabetes mellitus with stage 1 chronic kidney disease, without long-term current use of insulin  (HCC)  Goal advised  F/u with [facility] endocrinologist  Lab Results   Component Value Date    HGBA1C 7.9 (H) 2024       Orders:    IRIS Diabetic eye exam    Comprehensive metabolic panel; Future    Albumin / creatinine urine ratio; Future    Prostate cancer screening  yearly  Orders:    PSA Total (Reflex To Free); Future    Other hyperlipidemia  On statin for risk reduction  Orders:    Lipid Panel with Direct LDL reflex; Future    atorvastatin (LIPITOR) 10 mg tablet; Take 1 tablet (10 mg total) by mouth daily    Primary hypertension  Stable on ACEi  Orders:    lisinopril (ZESTRIL) 10 mg tablet; Take 1 tablet (10 mg total) by mouth daily    Erectile dysfunction, unspecified erectile dysfunction type  New  Start viagra  Risks/benefits/SE advised  Orders:    sildenafil (VIAGRA) 100 mg tablet; Take 1 tablet (100 mg total) by mouth daily as needed for erectile dysfunction    CKD (chronic kidney disease) stage 1, GFR 90 ml/min or greater  Lab Results   Component Value Date    EGFR 97 2024    EGFR 97 10/09/2023    EGFR 95 2023    CREATININE 0.87 2024    CREATININE 0.86 10/09/2023    CREATININE 0.91 2023            BMI 27.0-27.9,adult  Wt loss suggested       Declined stress test       History of Present Illness   HPI  Dm on po meds, seeing endocrinologist, A1c improving, not at goal    Htn stable on ACEi    HLD, taking statin, diet aware    Has some leakage before urination  Flow/stream good other wise  Urinates more on jardiance    Having ED issues  Achieve but loses erection, 50%  Able to penetrate    Seeing endo in [facility] on jardiance    Review of Systems   Respiratory:  Negative for cough and  "shortness of breath.      History reviewed. No pertinent family history.   Social History     Tobacco Use    Smoking status: Never     Passive exposure: Past    Smokeless tobacco: Never   Vaping Use    Vaping status: Never Used   Substance Use Topics    Alcohol use: Yes     Alcohol/week: 1.0 standard drink of alcohol     Types: 1 Cans of beer per week    Drug use: No      Current Outpatient Medications   Medication Instructions    atorvastatin (LIPITOR) 10 mg, Oral, Daily    Blood Glucose Monitoring Suppl (Contour Next One) KIT Use one test strip once daily [E11.29]    ciclopirox (LOPROX) 0.77 % cream As needed    clotrimazole-betamethasone (LOTRISONE) 1-0.05 % cream Topical, 2 times daily, Short term, sparingly to area: right leg    Empagliflozin (JARDIANCE) 10 mg, Daily    glucose blood (Contour Next Test) test strip Use one test strip once daily [E11.29]    Lancets (onetouch ultrasoft) lancets Use one test strip once daily [E11.29]    lisinopril (ZESTRIL) 10 mg, Oral, Daily    sildenafil (VIAGRA) 100 mg, Oral, Daily PRN     >>>>>>>>  Return in about 6 months (around 8/4/2025) for Annual physical.  >>>>>>>>    Visit Vitals  /84   Pulse 76   Temp 97.7 °F (36.5 °C)   Resp 19   Ht 6' 5\" (1.956 m)   Wt 104 kg (229 lb 3.2 oz)   BMI 27.18 kg/m²   Smoking Status Never   BSA 2.37 m²        Objective   /84   Pulse 76   Temp 97.7 °F (36.5 °C)   Resp 19   Ht 6' 5\" (1.956 m)   Wt 104 kg (229 lb 3.2 oz)   BMI 27.18 kg/m²      Physical Exam  Vitals and nursing note reviewed.   Constitutional:       General: He is not in acute distress.     Appearance: He is well-developed. He is not ill-appearing.   HENT:      Head: Normocephalic.      Right Ear: Tympanic membrane normal.      Left Ear: Tympanic membrane normal.   Eyes:      General: No scleral icterus.     Conjunctiva/sclera: Conjunctivae normal.   Neck:      Vascular: No carotid bruit.   Cardiovascular:      Rate and Rhythm: Normal rate and regular rhythm. "      Heart sounds: No murmur heard.  Pulmonary:      Effort: Pulmonary effort is normal. No respiratory distress.      Breath sounds: No wheezing.   Abdominal:      General: There is no distension.      Palpations: Abdomen is soft.      Tenderness: There is no abdominal tenderness.   Musculoskeletal:         General: No deformity.      Cervical back: Neck supple.      Right lower leg: No edema.      Left lower leg: No edema.   Skin:     General: Skin is warm and dry.      Coloration: Skin is not pale.   Neurological:      Mental Status: He is alert.      Motor: No weakness.      Gait: Gait normal.   Psychiatric:         Mood and Affect: Mood normal.         Behavior: Behavior normal.         Thought Content: Thought content normal.

## 2025-02-11 NOTE — ASSESSMENT & PLAN NOTE
On statin for risk reduction  Orders:    Lipid Panel with Direct LDL reflex; Future    atorvastatin (LIPITOR) 10 mg tablet; Take 1 tablet (10 mg total) by mouth daily

## 2025-02-12 NOTE — ASSESSMENT & PLAN NOTE
Lab Results   Component Value Date    EGFR 97 04/17/2024    EGFR 97 10/09/2023    EGFR 95 03/06/2023    CREATININE 0.87 04/17/2024    CREATININE 0.86 10/09/2023    CREATININE 0.91 03/06/2023

## 2025-02-12 NOTE — ASSESSMENT & PLAN NOTE
New  Start viagra  Risks/benefits/SE advised  Orders:    sildenafil (VIAGRA) 100 mg tablet; Take 1 tablet (100 mg total) by mouth daily as needed for erectile dysfunction

## 2025-05-05 ENCOUNTER — HOSPITAL ENCOUNTER (EMERGENCY)
Facility: HOSPITAL | Age: 65
Discharge: HOME | End: 2025-05-06
Payer: MEDICARE

## 2025-05-05 ENCOUNTER — APPOINTMENT (EMERGENCY)
Dept: RADIOLOGY | Facility: HOSPITAL | Age: 65
End: 2025-05-05
Payer: MEDICARE

## 2025-05-05 DIAGNOSIS — R07.9 CHEST PAIN: Primary | ICD-10-CM

## 2025-05-05 LAB
ANION GAP SERPL CALCULATED.3IONS-SCNC: 11 MMOL/L (ref 4–13)
BASOPHILS # BLD AUTO: 0.02 THOUSANDS/ÂΜL (ref 0–0.1)
BASOPHILS NFR BLD AUTO: 0 % (ref 0–1)
BUN SERPL-MCNC: 23 MG/DL (ref 5–25)
CALCIUM SERPL-MCNC: 9.4 MG/DL (ref 8.4–10.2)
CARDIAC TROPONIN I PNL SERPL HS: 5 NG/L (ref ?–50)
CHLORIDE SERPL-SCNC: 105 MMOL/L (ref 96–108)
CO2 SERPL-SCNC: 24 MMOL/L (ref 21–32)
CREAT SERPL-MCNC: 0.88 MG/DL (ref 0.6–1.3)
EOSINOPHIL # BLD AUTO: 0.18 THOUSAND/ÂΜL (ref 0–0.61)
EOSINOPHIL NFR BLD AUTO: 3 % (ref 0–6)
ERYTHROCYTE [DISTWIDTH] IN BLOOD BY AUTOMATED COUNT: 13.2 % (ref 11.6–15.1)
GFR SERPL CREATININE-BSD FRML MDRD: 90 ML/MIN/1.73SQ M
GLUCOSE SERPL-MCNC: 172 MG/DL (ref 65–140)
HCT VFR BLD AUTO: 44.4 % (ref 36.5–49.3)
HGB BLD-MCNC: 14.8 G/DL (ref 12–17)
IMM GRANULOCYTES # BLD AUTO: 0.02 THOUSAND/UL (ref 0–0.2)
IMM GRANULOCYTES NFR BLD AUTO: 0 % (ref 0–2)
LYMPHOCYTES # BLD AUTO: 1.82 THOUSANDS/ÂΜL (ref 0.6–4.47)
LYMPHOCYTES NFR BLD AUTO: 30 % (ref 14–44)
MCH RBC QN AUTO: 30.5 PG (ref 26.8–34.3)
MCHC RBC AUTO-ENTMCNC: 33.3 G/DL (ref 31.4–37.4)
MCV RBC AUTO: 91 FL (ref 82–98)
MONOCYTES # BLD AUTO: 0.48 THOUSAND/ÂΜL (ref 0.17–1.22)
MONOCYTES NFR BLD AUTO: 8 % (ref 4–12)
NEUTROPHILS # BLD AUTO: 3.48 THOUSANDS/ÂΜL (ref 1.85–7.62)
NEUTS SEG NFR BLD AUTO: 59 % (ref 43–75)
NRBC BLD AUTO-RTO: 0 /100 WBCS
PLATELET # BLD AUTO: 170 THOUSANDS/UL (ref 149–390)
PMV BLD AUTO: 11.3 FL (ref 8.9–12.7)
POTASSIUM SERPL-SCNC: 4 MMOL/L (ref 3.5–5.3)
RBC # BLD AUTO: 4.86 MILLION/UL (ref 3.88–5.62)
SODIUM SERPL-SCNC: 140 MMOL/L (ref 135–147)
WBC # BLD AUTO: 6 THOUSAND/UL (ref 4.31–10.16)

## 2025-05-05 PROCEDURE — 85025 COMPLETE CBC W/AUTO DIFF WBC: CPT

## 2025-05-05 PROCEDURE — 99285 EMERGENCY DEPT VISIT HI MDM: CPT

## 2025-05-05 PROCEDURE — 84484 ASSAY OF TROPONIN QUANT: CPT

## 2025-05-05 PROCEDURE — 71045 X-RAY EXAM CHEST 1 VIEW: CPT

## 2025-05-05 PROCEDURE — 36415 COLL VENOUS BLD VENIPUNCTURE: CPT

## 2025-05-05 PROCEDURE — 93005 ELECTROCARDIOGRAM TRACING: CPT

## 2025-05-05 PROCEDURE — 80048 BASIC METABOLIC PNL TOTAL CA: CPT

## 2025-05-05 PROCEDURE — 96374 THER/PROPH/DIAG INJ IV PUSH: CPT

## 2025-05-05 RX ORDER — KETOROLAC TROMETHAMINE 30 MG/ML
15 INJECTION, SOLUTION INTRAMUSCULAR; INTRAVENOUS ONCE
Status: COMPLETED | OUTPATIENT
Start: 2025-05-05 | End: 2025-05-05

## 2025-05-05 RX ORDER — SODIUM CHLORIDE 9 MG/ML
3 INJECTION INTRAVENOUS
Status: DISCONTINUED | OUTPATIENT
Start: 2025-05-05 | End: 2025-05-06 | Stop reason: HOSPADM

## 2025-05-05 RX ADMIN — KETOROLAC TROMETHAMINE 15 MG: 30 INJECTION, SOLUTION INTRAMUSCULAR; INTRAVENOUS at 22:58

## 2025-05-06 VITALS
TEMPERATURE: 97.7 F | SYSTOLIC BLOOD PRESSURE: 132 MMHG | HEART RATE: 50 BPM | RESPIRATION RATE: 20 BRPM | OXYGEN SATURATION: 96 % | DIASTOLIC BLOOD PRESSURE: 68 MMHG

## 2025-05-06 LAB
2HR DELTA HS TROPONIN: 1 NG/L
ATRIAL RATE: 53 BPM
CARDIAC TROPONIN I PNL SERPL HS: 6 NG/L (ref ?–50)
P AXIS: 65 DEGREES
PR INTERVAL: 174 MS
QRS AXIS: -7 DEGREES
QRSD INTERVAL: 104 MS
QT INTERVAL: 434 MS
QTC INTERVAL: 407 MS
T WAVE AXIS: 66 DEGREES
VENTRICULAR RATE: 53 BPM

## 2025-05-06 PROCEDURE — 36415 COLL VENOUS BLD VENIPUNCTURE: CPT

## 2025-05-06 PROCEDURE — 93010 ELECTROCARDIOGRAM REPORT: CPT | Performed by: INTERNAL MEDICINE

## 2025-05-06 PROCEDURE — 84484 ASSAY OF TROPONIN QUANT: CPT

## 2025-05-06 NOTE — ED CARE HANDOFF
Emergency Department Sign Out Note        Sign out and transfer of care from Dr. Powell. See Separate Emergency Department note.     The patient, Jayy Wells, was evaluated by the previous provider for chest pain.    Workup Completed:  Labs    ED Course / Workup Pending (followup):  Pending second trop which was delta of 1. Will d/c patient and instruct to follow up with Cards.  Return precautions discussed and patient was discharged home.      HEART Risk Score      Flowsheet Row Most Recent Value   Heart Score Risk Calculator    History 2 Filed at: 05/05/2025 2314   ECG 0 Filed at: 05/05/2025 2314   Age 2 Filed at: 05/05/2025 2314   Risk Factors 0 Filed at: 05/05/2025 2314   Troponin 0 Filed at: 05/05/2025 2314   HEART Score 4 Filed at: 05/05/2025 2314          No data recorded                             Procedures  Medical Decision Making  Amount and/or Complexity of Data Reviewed  Labs: ordered.  Radiology: ordered and independent interpretation performed.    Risk  Prescription drug management.            Disposition  Final diagnoses:   Chest pain     Time reflects when diagnosis was documented in both MDM as applicable and the Disposition within this note       Time User Action Codes Description Comment    5/5/2025 11:39 PM Sarwat Powell Add [R07.9] Chest pain           ED Disposition       ED Disposition   Discharge    Condition   Stable    Date/Time   Tue May 6, 2025 0124    Comment   Jayy Wells discharge to home/self care.                   Follow-up Information       Follow up With Specialties Details Why Contact Info Additional Information    [facility] Cardiology Schedule an appointment as soon as possible for a visit   [address] [place] [address] [address]  [facility] [address]  [phone] [facility], [address] [place] [address] [address], [address], [place], [place], [address]   [phone]    DO Wenceslao Ramirez  Medicine   [address] [address]  [phone]             Patient's Medications   Discharge Prescriptions    No medications on file            ED Provider  Electronically Signed by     Eduard Morales MD  05/06/25 0121

## 2025-05-06 NOTE — DISCHARGE INSTRUCTIONS
Follow up with the cardiology team as soon as possible.     If you develop new or worsening symptoms, please return to the Emergency Department for further evaluation.

## 2025-05-06 NOTE — ED PROVIDER NOTES
Time reflects when diagnosis was documented in both MDM as applicable and the Disposition within this note       Time User Action Codes Description Comment    5/5/2025 11:39 PM Sarwat Powell Add [R07.9] Chest pain           ED Disposition       None          Assessment & Plan       Medical Decision Making  Amount and/or Complexity of Data Reviewed  Labs: ordered. Decision-making details documented in ED Course.  Radiology: ordered and independent interpretation performed.  ECG/medicine tests:  Decision-making details documented in ED Course.    Risk  Prescription drug management.      64 y/o M presents for evaluation of chest pain.  Patient reports intermittent chest pain over the past 3 to 4 days.  States it feels like intense pressure over his anterior sternum with radiation to bilateral arms.  Patient states episodes that happen 1-2 times a day.  First episode was about 3 days ago while he was working as a .  He became more concerned tonight when the pain came on at rest.  Only lasted a few moments and subsided.  Denies shortness of breath.  Denies any known cardiac history.  Vital signs stable, EKG is nonischemic.  Plan for ACS rule out including troponin with delta. Pt will need cardiology follow up regardless, referral placed.     ED Course as of 05/06/25 0012   Mon May 05, 2025   8728 ECG 12 lead  Procedure Note: EKG  Date/Time: 05/05/25 10:59 PM   Interpreted by: Sarwat Powell MD  Indications / Diagnosis: CP  ECG reviewed by me, the ED Provider: yes   The EKG demonstrates:  Rhythm: sinus bradycardia   Intervals: normal intervals  Axis: normal axis  QRS/Blocks: normal QRS  ST Changes: No acute ST Changes, no STD/OZ.     2329 hs TnI 0hr: 5       Medications   sodium chloride (PF) 0.9 % injection 3 mL (has no administration in time range)   ketorolac (TORADOL) injection 15 mg (15 mg Intravenous Given 5/5/25 2257)       ED Risk Strat Scores   HEART Risk Score      Flowsheet Row Most Recent Value    Heart Score Risk Calculator    History 2 Filed at: 05/05/2025 2314   ECG 0 Filed at: 05/05/2025 2314   Age 2 Filed at: 05/05/2025 2314   Risk Factors 0 Filed at: 05/05/2025 2314   Troponin 0 Filed at: 05/05/2025 2314   HEART Score 4 Filed at: 05/05/2025 2314          HEART Risk Score      Flowsheet Row Most Recent Value   Heart Score Risk Calculator    History 2 Filed at: 05/05/2025 2314   ECG 0 Filed at: 05/05/2025 2314   Age 2 Filed at: 05/05/2025 2314   Risk Factors 0 Filed at: 05/05/2025 2314   Troponin 0 Filed at: 05/05/2025 2314   HEART Score 4 Filed at: 05/05/2025 2314                      No data recorded                            History of Present Illness       Chief Complaint   Patient presents with    Chest Pain     Chest pain that started 3-4 days ago that is getting progressively worse.       Past Medical History:   Diagnosis Date    History of low back pain     Osteoarthritis     Tinea corporis       Past Surgical History:   Procedure Laterality Date    COLONOSCOPY      HIP SURGERY Right     WISDOM TOOTH EXTRACTION      x1      No family history on file.   Social History     Tobacco Use    Smoking status: Never     Passive exposure: Past    Smokeless tobacco: Never   Vaping Use    Vaping status: Never Used   Substance Use Topics    Alcohol use: Yes     Alcohol/week: 1.0 standard drink of alcohol     Types: 1 Cans of beer per week    Drug use: No      E-Cigarette/Vaping    E-Cigarette Use Never User       E-Cigarette/Vaping Substances    Nicotine No     THC No     CBD No     Flavoring No     Other No     Unknown No       I have reviewed and agree with the history as documented.     HPI  See MDM  Review of Systems   Constitutional:  Negative for chills and fever.   HENT:  Negative for ear pain and sore throat.    Eyes:  Negative for pain and visual disturbance.   Respiratory:  Negative for cough and shortness of breath.    Cardiovascular:  Positive for chest pain. Negative for palpitations.    Gastrointestinal:  Negative for abdominal pain and vomiting.   Genitourinary:  Negative for dysuria and hematuria.   Musculoskeletal:  Negative for arthralgias and back pain.   Skin:  Negative for color change and rash.   Neurological:  Negative for seizures and syncope.   All other systems reviewed and are negative.          Objective       ED Triage Vitals [05/05/25 2251]   Temperature Pulse Blood Pressure Respirations SpO2 Patient Position - Orthostatic VS   97.7 °F (36.5 °C) 55 (!) 173/85 18 99 % Lying      Temp Source Heart Rate Source BP Location FiO2 (%) Pain Score    Oral Monitor Left arm -- 10 - Worst Possible Pain      Vitals      Date and Time Temp Pulse SpO2 Resp BP Pain Score FACES Pain Rating User   05/05/25 2330 -- 50 98 % 20 134/72 -- --    05/05/25 2315 -- 49 98 % 20 134/68 -- --    05/05/25 2251 97.7 °F (36.5 °C) 55 99 % 18 173/85 10 - Worst Possible Pain -- CS            Physical Exam  Vitals and nursing note reviewed.   Constitutional:       General: He is not in acute distress.     Appearance: He is well-developed. He is not toxic-appearing.   HENT:      Head: Normocephalic and atraumatic.      Right Ear: External ear normal.      Left Ear: External ear normal.      Nose: Nose normal.      Mouth/Throat:      Pharynx: Oropharynx is clear. No oropharyngeal exudate or posterior oropharyngeal erythema.   Eyes:      Extraocular Movements: Extraocular movements intact.      Conjunctiva/sclera: Conjunctivae normal.      Pupils: Pupils are equal, round, and reactive to light.   Cardiovascular:      Rate and Rhythm: Regular rhythm. Bradycardia present.      Pulses: Normal pulses.      Heart sounds: Normal heart sounds. No murmur heard.     No friction rub. No gallop.   Pulmonary:      Effort: Pulmonary effort is normal. No respiratory distress.      Breath sounds: Normal breath sounds. No wheezing, rhonchi or rales.   Abdominal:      General: Abdomen is flat.      Palpations: Abdomen is soft.       Tenderness: There is no abdominal tenderness. There is no guarding or rebound.   Musculoskeletal:         General: Normal range of motion.      Cervical back: Normal range of motion. No rigidity.      Right lower leg: No edema.      Left lower leg: No edema.   Skin:     General: Skin is warm and dry.      Capillary Refill: Capillary refill takes less than 2 seconds.   Neurological:      General: No focal deficit present.      Mental Status: He is alert.   Psychiatric:         Mood and Affect: Mood normal.         Results Reviewed       Procedure Component Value Units Date/Time    HS Troponin I 4hr [[id number]]     Lab Status: No result Specimen: Blood     HS Troponin I 2hr [[id number]]     Lab Status: No result Specimen: Blood     HS Troponin 0hr (reflex protocol) [[id number]]  (Normal) Collected: 05/05/25 2258    Lab Status: Final result Specimen: Blood from Arm, Right Updated: 05/05/25 2329     hs TnI 0hr 5 ng/L     Basic metabolic panel [[id number]]  (Abnormal) Collected: 05/05/25 2258    Lab Status: Final result Specimen: Blood from Arm, Right Updated: 05/05/25 2322     Sodium 140 mmol/L      Potassium 4.0 mmol/L      Chloride 105 mmol/L      CO2 24 mmol/L      ANION GAP 11 mmol/L      BUN 23 mg/dL      Creatinine 0.88 mg/dL      Glucose 172 mg/dL      Calcium 9.4 mg/dL      eGFR 90 ml/min/1.73sq m     Narrative:      National Kidney Disease Foundation guidelines for Chronic Kidney Disease (CKD):     Stage 1 with normal or high GFR (GFR > 90 mL/min/1.73 square meters)    Stage 2 Mild CKD (GFR = 60-89 mL/min/1.73 square meters)    Stage 3A Moderate CKD (GFR = 45-59 mL/min/1.73 square meters)    Stage 3B Moderate CKD (GFR = 30-44 mL/min/1.73 square meters)    Stage 4 Severe CKD (GFR = 15-29 mL/min/1.73 square meters)    Stage 5 End Stage CKD (GFR <15 mL/min/1.73 square meters)  Note: GFR calculation is accurate only with a steady state creatinine    CBC and differential [[id number]] Collected: 05/05/25 2258    Lab  Status: Final result Specimen: Blood from Arm, Right Updated: 05/05/25 2305     WBC 6.00 Thousand/uL      RBC 4.86 Million/uL      Hemoglobin 14.8 g/dL      Hematocrit 44.4 %      MCV 91 fL      MCH 30.5 pg      MCHC 33.3 g/dL      RDW 13.2 %      MPV 11.3 fL      Platelets 170 Thousands/uL      nRBC 0 /100 WBCs      Segmented % 59 %      Immature Grans % 0 %      Lymphocytes % 30 %      Monocytes % 8 %      Eosinophils Relative 3 %      Basophils Relative 0 %      Absolute Neutrophils 3.48 Thousands/µL      Absolute Immature Grans 0.02 Thousand/uL      Absolute Lymphocytes 1.82 Thousands/µL      Absolute Monocytes 0.48 Thousand/µL      Eosinophils Absolute 0.18 Thousand/µL      Basophils Absolute 0.02 Thousands/µL             X-ray chest 1 view portable   ED Interpretation by Sarwat Powell MD (05/06 0006)   No acute cardiopulmonary findings per my interpretation           Procedures    ED Medication and Procedure Management   Prior to Admission Medications   Prescriptions Last Dose Informant Patient Reported? Taking?   Blood Glucose Monitoring Suppl (Contour Next One) KIT   No No   Sig: Use one test strip once daily [E11.29]   Empagliflozin (JARDIANCE) 10 MG TABS tablet   Yes No   Sig: Take 10 mg by mouth daily   Lancets (onetouch ultrasoft) lancets   No No   Sig: Use one test strip once daily [E11.29]   atorvastatin (LIPITOR) 10 mg tablet   No No   Sig: Take 1 tablet (10 mg total) by mouth daily   ciclopirox (LOPROX) 0.77 % cream   Yes No   Sig: As needed   clotrimazole-betamethasone (LOTRISONE) 1-0.05 % cream   No No   Sig: Apply topically 2 (two) times a day Short term, sparingly to area: right leg   Patient taking differently: Apply topically 2 (two) times a day Short term, sparingly to area: right leg; as needed   glucose blood (Contour Next Test) test strip   No No   Sig: Use one test strip once daily [E11.29]   lisinopril (ZESTRIL) 10 mg tablet   No No   Sig: Take 1 tablet (10 mg total) by mouth daily    sildenafil (VIAGRA) 100 mg tablet   No No   Sig: Take 1 tablet (100 mg total) by mouth daily as needed for erectile dysfunction      Facility-Administered Medications: None     Patient's Medications   Discharge Prescriptions    No medications on file       ED SEPSIS DOCUMENTATION   Time reflects when diagnosis was documented in both MDM as applicable and the Disposition within this note       Time User Action Codes Description Comment    5/5/2025 11:39 PM Sarwat Powell Add [R07.9] Chest pain                  Sarwat Powell MD  05/06/25 0012

## 2025-05-07 ENCOUNTER — VBI (OUTPATIENT)
Dept: FAMILY MEDICINE CLINIC | Facility: CLINIC | Age: 65
End: 2025-05-07

## 2025-05-07 NOTE — LETTER
[facility]  [address]  [place] [address]    Date: 05/09/25    Jayy Wells  [address] [address]    Dear Jayy:                                                                                                                                Thank you for choosing [facility] emergency department for care.  Your primary care provider wants to make sure that your ongoing medical care is being addressed. If you require follow up care as a result of your emergency department visit, there are a few things the practice would like you to know.                As part of the network's continuing commitment to caring for our patients, we have added more same day appointments and have extended office hours to meet your medical needs. After hours, on-call physicians are available via your primary care provider's main office line.               We encourage you to contact our office prior to seeking treatment to discuss your symptoms with the medical staff.  Together, we can determine the correct course of action.  A majority of non-emergent conditions such as: common cold, flu-like symptoms, fevers, strains/sprains, dislocations, minor burns, cuts and animal bites can be treated at [facility] facilities. Diagnostic testing is available at some sites.               Of course, if you are experiencing a life threatening medical emergency call 911 or proceed directly to the nearest emergency room.    Your nearest [facility] facility is conveniently located at:    [facility]  [address]  [place] [address]  [phone]  SKIP THE WAIT  Conveniently offered at most [facility] locations  [place] your spot online at [email or web address] or on the [facility]    Sincerely,    [facility]  Dept: [phone]

## 2025-05-07 NOTE — TELEPHONE ENCOUNTER
05/07/25 10:20 AM    Patient contacted post ED visit, first outreach attempt made. Message was left for patient to return a call to the [facility] Department at [facility]: Phone [phone].    Thank you.  [facility]  [facility]

## 2025-05-08 ENCOUNTER — TELEPHONE (OUTPATIENT)
Age: 65
End: 2025-05-08

## 2025-05-08 NOTE — TELEPHONE ENCOUNTER
05/08/25 10:40 AM    Patient contacted post ED visit, second outreach attempt made. Message was left for patient to return a call to the [facility] Department at [facility]: Phone [phone].    Thank you.  [facility]  [facility]

## 2025-05-08 NOTE — TELEPHONE ENCOUNTER
Patient wife is calling to get patient seen tonight or tomorrow because he was just released from the hospital for chest pains and he had another little episode. Per patient wife she  does not feel safe going the weekend and not being seen. Please follow up and advise. Thank you in advance.

## 2025-05-09 ENCOUNTER — OFFICE VISIT (OUTPATIENT)
Dept: FAMILY MEDICINE CLINIC | Facility: CLINIC | Age: 65
End: 2025-05-09
Payer: MEDICARE

## 2025-05-09 VITALS
WEIGHT: 229 LBS | HEART RATE: 60 BPM | TEMPERATURE: 95.8 F | DIASTOLIC BLOOD PRESSURE: 68 MMHG | BODY MASS INDEX: 27.16 KG/M2 | RESPIRATION RATE: 18 BRPM | SYSTOLIC BLOOD PRESSURE: 130 MMHG

## 2025-05-09 DIAGNOSIS — E78.49 OTHER HYPERLIPIDEMIA: ICD-10-CM

## 2025-05-09 DIAGNOSIS — N18.1 TYPE 2 DIABETES MELLITUS WITH STAGE 1 CHRONIC KIDNEY DISEASE, WITHOUT LONG-TERM CURRENT USE OF INSULIN  (HCC): ICD-10-CM

## 2025-05-09 DIAGNOSIS — I10 PRIMARY HYPERTENSION: ICD-10-CM

## 2025-05-09 DIAGNOSIS — R07.89 OTHER CHEST PAIN: Primary | ICD-10-CM

## 2025-05-09 DIAGNOSIS — E11.22 TYPE 2 DIABETES MELLITUS WITH STAGE 1 CHRONIC KIDNEY DISEASE, WITHOUT LONG-TERM CURRENT USE OF INSULIN  (HCC): ICD-10-CM

## 2025-05-09 PROCEDURE — G2211 COMPLEX E/M VISIT ADD ON: HCPCS | Performed by: FAMILY MEDICINE

## 2025-05-09 PROCEDURE — 99214 OFFICE O/P EST MOD 30 MIN: CPT | Performed by: FAMILY MEDICINE

## 2025-05-09 RX ORDER — SUCRALFATE ORAL 1 G/10ML
1 SUSPENSION ORAL 4 TIMES DAILY
Qty: 400 ML | Refills: 0 | Status: SHIPPED | OUTPATIENT
Start: 2025-05-09

## 2025-05-09 NOTE — PROGRESS NOTES
Name: Jayy Wells      : 1960      MRN: [id number]  Encounter Provider: Giovanni Hankins DO  Encounter Date: 2025   Encounter department: [facility]  :  Assessment & Plan  Other chest pain  Multiple risk factors  Cardiology appt on 25  Will try to get an EST done   Consider esophagitis, trial carafate in meantime  Orders:    Stress test only, exercise; Future    sucralfate (CARAFATE) 1 g/10 mL suspension; Take 10 mL (1 g total) by mouth 4 (four) times a day    Type 2 diabetes mellitus with stage 1 chronic kidney disease, without long-term current use of insulin  (HCC)  Not at goal  Continue meds/diet  Seeing [facility]  Lab Results   Component Value Date    HGBA1C 7.9 (H) 2024            Primary hypertension  Stable on meds       Other hyperlipidemia  On lipitor 10gm  Last           History of Present Illness   HPI  Still with sscp  Still on/off  Triggers with exertion but also at rest  Random  No gerd sx or dysphagia  Does have diabetes  On jardiance  ER records reviewed    Review of Systems   Respiratory:  Negative for shortness of breath.    Cardiovascular:  Positive for chest pain. Negative for leg swelling.     History reviewed. No pertinent family history.   Social History     Tobacco Use    Smoking status: Never     Passive exposure: Past    Smokeless tobacco: Never   Vaping Use    Vaping status: Never Used   Substance Use Topics    Alcohol use: Yes     Alcohol/week: 1.0 standard drink of alcohol     Types: 1 Cans of beer per week    Drug use: No      Current Outpatient Medications   Medication Instructions    atorvastatin (LIPITOR) 10 mg, Oral, Daily    Blood Glucose Monitoring Suppl (Contour Next One) KIT Use one test strip once daily [E11.29]    ciclopirox (LOPROX) 0.77 % cream As needed    clotrimazole-betamethasone (LOTRISONE) 1-0.05 % cream Topical, 2 times daily, Short term, sparingly to area: right leg    Empagliflozin (JARDIANCE) 10 mg, Daily     glucose blood (Contour Next Test) test strip Use one test strip once daily [E11.29]    Lancets (onetouch ultrasoft) lancets Use one test strip once daily [E11.29]    lisinopril (ZESTRIL) 10 mg, Oral, Daily    sildenafil (VIAGRA) 100 mg, Oral, Daily PRN    sucralfate (CARAFATE) 1 g, Oral, 4 times daily     >>>>>>>>  Return if symptoms worsen or fail to improve.  >>>>>>>>    [POCT]  No results found for this or any previous visit (from the past 24 hours).    Visit Vitals  /68   Pulse 60   Temp (!) 95.8 °F (35.4 °C)   Resp 18   Wt 104 kg (229 lb)   BMI 27.16 kg/m²   Smoking Status Never   BSA 2.37 m²        Objective   /68   Pulse 60   Temp (!) 95.8 °F (35.4 °C)   Resp 18   Wt 104 kg (229 lb)   BMI 27.16 kg/m²      Physical Exam  Vitals and nursing note reviewed.   Constitutional:       General: He is not in acute distress.     Appearance: He is well-developed. He is not ill-appearing.   HENT:      Head: Normocephalic.      Right Ear: Tympanic membrane normal.      Left Ear: Tympanic membrane normal.      Mouth/Throat:      Mouth: Mucous membranes are moist.   Eyes:      General: No scleral icterus.     Conjunctiva/sclera: Conjunctivae normal.   Cardiovascular:      Rate and Rhythm: Normal rate and regular rhythm.      Pulses: no weak pulses.           Dorsalis pedis pulses are 2+ on the right side and 2+ on the left side.      Heart sounds: No murmur heard.  Pulmonary:      Effort: Pulmonary effort is normal. No respiratory distress.      Breath sounds: No wheezing or rales.   Abdominal:      General: There is no distension.      Palpations: Abdomen is soft.      Tenderness: There is no abdominal tenderness.   Musculoskeletal:         General: No deformity.      Cervical back: Neck supple.   Skin:     General: Skin is warm and dry.      Coloration: Skin is not pale.   Neurological:      Mental Status: He is alert.      Motor: No weakness.      Gait: Gait normal.   Psychiatric:         Mood and Affect:  Mood normal.         Behavior: Behavior normal.         Thought Content: Thought content normal.     Diabetic Foot Exam    Patient's shoes and socks removed.    Right Foot/Ankle   Right Foot Inspection  Skin Exam: skin intact. No abnormal color.     Toe Exam:  no right toe deformity    Sensory   Monofilament testing: intact    Vascular  The right DP pulse is 2+.     Left Foot/Ankle  Left Foot Inspection  Skin Exam: skin intact. Normal color.     Toe Exam: No left toe deformity.     Sensory   Monofilament testing: intact    Vascular  The left DP pulse is 2+.     Assign Risk Category  No deformity present  No loss of protective sensation  No weak pulses  Risk: 0

## 2025-05-10 NOTE — ASSESSMENT & PLAN NOTE
Not at goal  Continue meds/diet  Seeing [facility]  Lab Results   Component Value Date    HGBA1C 7.9 (H) 04/17/2024

## 2025-05-15 ENCOUNTER — HOSPITAL ENCOUNTER (INPATIENT)
Facility: HOSPITAL | Age: 65
LOS: 2 days | Discharge: HOME | DRG: 322 | End: 2025-05-17
Attending: INTERNAL MEDICINE | Admitting: INTERNAL MEDICINE
Payer: MEDICARE

## 2025-05-15 ENCOUNTER — PREP FOR PROCEDURE (OUTPATIENT)
Dept: CARDIOLOGY CLINIC | Facility: CLINIC | Age: 65
End: 2025-05-15

## 2025-05-15 ENCOUNTER — HOSPITAL ENCOUNTER (OUTPATIENT)
Dept: NON INVASIVE DIAGNOSTICS | Facility: HOSPITAL | Age: 65
Discharge: HOME | DRG: 322 | End: 2025-05-15
Attending: FAMILY MEDICINE
Payer: MEDICARE

## 2025-05-15 DIAGNOSIS — R07.89 OTHER CHEST PAIN: ICD-10-CM

## 2025-05-15 DIAGNOSIS — I20.89 ANGINA AT REST (HCC): ICD-10-CM

## 2025-05-15 DIAGNOSIS — E11.22 TYPE 2 DIABETES MELLITUS WITH STAGE 1 CHRONIC KIDNEY DISEASE, WITHOUT LONG-TERM CURRENT USE OF INSULIN  (HCC): ICD-10-CM

## 2025-05-15 DIAGNOSIS — I20.89 ANGINA AT REST (HCC): Primary | ICD-10-CM

## 2025-05-15 DIAGNOSIS — I10 PRIMARY HYPERTENSION: ICD-10-CM

## 2025-05-15 DIAGNOSIS — N18.1 TYPE 2 DIABETES MELLITUS WITH STAGE 1 CHRONIC KIDNEY DISEASE, WITHOUT LONG-TERM CURRENT USE OF INSULIN  (HCC): Primary | ICD-10-CM

## 2025-05-15 DIAGNOSIS — E11.22 TYPE 2 DIABETES MELLITUS WITH STAGE 1 CHRONIC KIDNEY DISEASE, WITHOUT LONG-TERM CURRENT USE OF INSULIN  (HCC): Primary | ICD-10-CM

## 2025-05-15 DIAGNOSIS — I25.118 CORONARY ARTERY DISEASE INVOLVING NATIVE CORONARY ARTERY OF NATIVE HEART WITH OTHER FORM OF ANGINA PECTORIS (HCC): ICD-10-CM

## 2025-05-15 DIAGNOSIS — N18.1 CKD (CHRONIC KIDNEY DISEASE) STAGE 1, GFR 90 ML/MIN OR GREATER: ICD-10-CM

## 2025-05-15 DIAGNOSIS — N18.1 TYPE 2 DIABETES MELLITUS WITH STAGE 1 CHRONIC KIDNEY DISEASE, WITHOUT LONG-TERM CURRENT USE OF INSULIN  (HCC): ICD-10-CM

## 2025-05-15 DIAGNOSIS — Z98.61 HISTORY OF PERCUTANEOUS CORONARY INTERVENTION: ICD-10-CM

## 2025-05-15 PROBLEM — R07.2 PRECORDIAL PAIN: Status: ACTIVE | Noted: 2025-05-15

## 2025-05-15 LAB
ANION GAP SERPL CALCULATED.3IONS-SCNC: 10 MMOL/L (ref 4–13)
BUN SERPL-MCNC: 24 MG/DL (ref 5–25)
CALCIUM SERPL-MCNC: 9.2 MG/DL (ref 8.4–10.2)
CHEST PAIN STATEMENT: NORMAL
CHLORIDE SERPL-SCNC: 104 MMOL/L (ref 96–108)
CO2 SERPL-SCNC: 23 MMOL/L (ref 21–32)
CREAT SERPL-MCNC: 0.77 MG/DL (ref 0.6–1.3)
EST. AVERAGE GLUCOSE BLD GHB EST-MCNC: 163 MG/DL
GFR SERPL CREATININE-BSD FRML MDRD: 95 ML/MIN/1.73SQ M
GLUCOSE SERPL-MCNC: 120 MG/DL (ref 65–140)
GLUCOSE SERPL-MCNC: 157 MG/DL (ref 65–140)
HBA1C MFR BLD: 7.3 %
MAX DIASTOLIC BP: 80 MMHG
MAX HR PERCENT: 91 %
MAX HR: 142 BPM
MAX PREDICTED HEART RATE: 155 BPM
PLATELET # BLD AUTO: 176 THOUSANDS/UL (ref 149–390)
PMV BLD AUTO: 11.4 FL (ref 8.9–12.7)
POTASSIUM SERPL-SCNC: 3.9 MMOL/L (ref 3.5–5.3)
PROTOCOL NAME: NORMAL
RATE PRESSURE PRODUCT: NORMAL
REASON FOR TERMINATION: NORMAL
SL CV STRESS RECOVERY BP: NORMAL MMHG
SL CV STRESS RECOVERY HR: 90 BPM
SL CV STRESS RECOVERY O2 SAT: 96 %
SL CV STRESS STAGE REACHED: 2
SODIUM SERPL-SCNC: 137 MMOL/L (ref 135–147)
STRESS ANGINA INDEX: 2
STRESS BASELINE BP: NORMAL MMHG
STRESS BASELINE HR: 71 BPM
STRESS O2 SAT REST: 97 %
STRESS PEAK HR: 142 BPM
STRESS POST ESTIMATED WORKLOAD: 7 METS
STRESS POST EXERCISE DUR MIN: 5 MIN
STRESS POST EXERCISE DUR MIN: 5 MIN
STRESS POST EXERCISE DUR SEC: 0 SEC
STRESS POST O2 SAT PEAK: 95 %
STRESS POST PEAK BP: 180 MMHG
STRESS POST PEAK HR: 142 BPM
STRESS POST PEAK SYSTOLIC BP: 180 MMHG
TARGET HR FORMULA: NORMAL

## 2025-05-15 PROCEDURE — 80048 BASIC METABOLIC PNL TOTAL CA: CPT | Performed by: NURSE PRACTITIONER

## 2025-05-15 PROCEDURE — 83036 HEMOGLOBIN GLYCOSYLATED A1C: CPT | Performed by: NURSE PRACTITIONER

## 2025-05-15 PROCEDURE — 93018 CV STRESS TEST I&R ONLY: CPT | Performed by: INTERNAL MEDICINE

## 2025-05-15 PROCEDURE — 93016 CV STRESS TEST SUPVJ ONLY: CPT | Performed by: INTERNAL MEDICINE

## 2025-05-15 PROCEDURE — 85049 AUTOMATED PLATELET COUNT: CPT | Performed by: NURSE PRACTITIONER

## 2025-05-15 PROCEDURE — 82948 REAGENT STRIP/BLOOD GLUCOSE: CPT

## 2025-05-15 PROCEDURE — 93017 CV STRESS TEST TRACING ONLY: CPT

## 2025-05-15 PROCEDURE — 93005 ELECTROCARDIOGRAM TRACING: CPT

## 2025-05-15 PROCEDURE — 99221 1ST HOSP IP/OBS SF/LOW 40: CPT | Performed by: FAMILY MEDICINE

## 2025-05-15 PROCEDURE — NC001 PR NO CHARGE: Performed by: NURSE PRACTITIONER

## 2025-05-15 RX ORDER — LISINOPRIL 10 MG/1
10 TABLET ORAL DAILY
Status: DISCONTINUED | OUTPATIENT
Start: 2025-05-15 | End: 2025-05-17 | Stop reason: HOSPADM

## 2025-05-15 RX ORDER — ATORVASTATIN CALCIUM 40 MG/1
40 TABLET, FILM COATED ORAL
Status: DISCONTINUED | OUTPATIENT
Start: 2025-05-15 | End: 2025-05-17 | Stop reason: HOSPADM

## 2025-05-15 RX ORDER — ACETAMINOPHEN 325 MG/1
650 TABLET ORAL EVERY 6 HOURS PRN
Status: DISCONTINUED | OUTPATIENT
Start: 2025-05-15 | End: 2025-05-17 | Stop reason: HOSPADM

## 2025-05-15 RX ORDER — SUCRALFATE 1 G/1
1 TABLET ORAL 4 TIMES DAILY
Status: DISCONTINUED | OUTPATIENT
Start: 2025-05-15 | End: 2025-05-17 | Stop reason: HOSPADM

## 2025-05-15 RX ORDER — ENOXAPARIN SODIUM 100 MG/ML
1 INJECTION SUBCUTANEOUS ONCE
Status: COMPLETED | OUTPATIENT
Start: 2025-05-15 | End: 2025-05-15

## 2025-05-15 RX ORDER — METOPROLOL TARTRATE 25 MG/1
25 TABLET, FILM COATED ORAL EVERY 12 HOURS SCHEDULED
Status: DISCONTINUED | OUTPATIENT
Start: 2025-05-15 | End: 2025-05-16

## 2025-05-15 RX ORDER — ATORVASTATIN CALCIUM 10 MG/1
10 TABLET, FILM COATED ORAL
Status: DISCONTINUED | OUTPATIENT
Start: 2025-05-15 | End: 2025-05-15

## 2025-05-15 RX ORDER — ASPIRIN 81 MG/1
81 TABLET, CHEWABLE ORAL DAILY
Status: DISCONTINUED | OUTPATIENT
Start: 2025-05-15 | End: 2025-05-17 | Stop reason: HOSPADM

## 2025-05-15 RX ORDER — INSULIN LISPRO 100 [IU]/ML
1-5 INJECTION, SOLUTION INTRAVENOUS; SUBCUTANEOUS
Status: DISCONTINUED | OUTPATIENT
Start: 2025-05-15 | End: 2025-05-17 | Stop reason: HOSPADM

## 2025-05-15 RX ADMIN — ASPIRIN 81 MG: 81 TABLET, CHEWABLE ORAL at 17:04

## 2025-05-15 RX ADMIN — SUCRALFATE 1 G: 1 TABLET ORAL at 21:12

## 2025-05-15 RX ADMIN — METOPROLOL TARTRATE 25 MG: 25 TABLET, FILM COATED ORAL at 22:28

## 2025-05-15 RX ADMIN — SUCRALFATE 1 G: 1 TABLET ORAL at 17:04

## 2025-05-15 RX ADMIN — ENOXAPARIN SODIUM 100 MG: 100 INJECTION SUBCUTANEOUS at 21:11

## 2025-05-15 RX ADMIN — INSULIN LISPRO 1 UNITS: 100 INJECTION, SOLUTION INTRAVENOUS; SUBCUTANEOUS at 21:12

## 2025-05-15 NOTE — ASSESSMENT & PLAN NOTE
Lab Results   Component Value Date    EGFR 90 05/05/2025    EGFR 97 04/17/2024    EGFR 97 10/09/2023    CREATININE 0.88 05/05/2025    CREATININE 0.87 04/17/2024    CREATININE 0.86 10/09/2023   appears to be at baseline  continue to monitor

## 2025-05-15 NOTE — H&P
"H&P - Cardiology   Name: Jayy Wells 65 y.o. male I MRN: [id number]  Unit/Bed#: [address] [place] [address] Date of Admission: 5/15/2025   Date of Service: 5/15/2025 I Hospital Day: 0     Assessment & Plan  Precordial pain  patient recently seen in the emergency room with escalating chest discomfort.  At that time, troponins were negative and EKG was unremarkable  patient seen by his PCP on 5/5/2025 and ordered for exercise nonnuclear stress test  patient experience chest discomfort with ST depression seen on exercise stress test  will obtain echocardiogram  patient will be admitted for further evaluation with left heart catheterization  will escalate his statin to intermediate intensity with Lipitor 40 mg daily  will give him Lovenox 1 mg per kilogram subcutaneously times one  will add low-dose Lopressor 25 mg BID  continue aspirin 81 mg once a day    Type 2 diabetes mellitus with stage 1 chronic kidney disease, without long-term current use of insulin  ([facility])  Lab Results   Component Value Date    HGBA1C 7.9 (H) 04/17/2024       No results for input(s): \"POCGLU\" in the last 72 hours.    Blood Sugar Average: Last 72 hrs:  it has been over one year since patient's hemoglobin A1c is been checked, will recheck during this admission  managed per the hospitalist team  SGL T2 inhibitor on hold    Primary hypertension  blood pressures currently stable  continue lisinopril 10 mg daily    CKD (chronic kidney disease) stage 1, GFR 90 ml/min or greater  Lab Results   Component Value Date    EGFR 90 05/05/2025    EGFR 97 04/17/2024    EGFR 97 10/09/2023    CREATININE 0.88 05/05/2025    CREATININE 0.87 04/17/2024    CREATININE 0.86 10/09/2023   appears to be at baseline  continue to monitor  Other hyperlipidemia  no recent lipid profile will obtain in the a.m.  will increase his home dose of Lipitor from 10 mg daily to 40 mg daily at this time    History of Present Illness   Jayy Wells is a 65 y.o. male who presented today for " outpatient nonnuclear stress test. During stress test patient experience met sternal chest discomfort and EKG changes with ST depression. Patient is being admitted for further evaluation. Patient has a history for diabetes, Gerd, early chronic kidney disease, hypertension and dyslipidemia.    Review of Systems   Constitutional:  Positive for activity change and fatigue.   HENT: Negative.  Negative for congestion, hearing loss, sinus pain and tinnitus.    Eyes: Negative.  Negative for photophobia and visual disturbance.   Respiratory:  Positive for chest tightness and shortness of breath.    Cardiovascular:  Positive for chest pain. Negative for palpitations and leg swelling.   Gastrointestinal: Negative.  Negative for abdominal distention.   Endocrine: Negative.  Negative for polydipsia, polyphagia and polyuria.   Genitourinary: Negative.  Negative for difficulty urinating.   Musculoskeletal: Negative.    Neurological: Negative.  Negative for dizziness, syncope, weakness and light-headedness.   Hematological: Negative.    Psychiatric/Behavioral: Negative.       Historical Information   Past Medical History:   Diagnosis Date    History of low back pain     Osteoarthritis     Tinea corporis      Past Surgical History:   Procedure Laterality Date    COLONOSCOPY      HIP SURGERY Right     WISDOM TOOTH EXTRACTION      x1     Social History     Tobacco Use    Smoking status: Never     Passive exposure: Past    Smokeless tobacco: Never   Vaping Use    Vaping status: Never Used   Substance and Sexual Activity    Alcohol use: Yes     Alcohol/week: 1.0 standard drink of alcohol     Types: 1 Cans of beer per week    Drug use: No    Sexual activity: Not on file     E-Cigarette/Vaping    E-Cigarette Use Never User      E-Cigarette/Vaping Substances    Nicotine No     THC No     CBD No     Flavoring No     Other No     Unknown No      History reviewed. No pertinent family history.  Social History     Tobacco Use    Smoking  status: Never     Passive exposure: Past    Smokeless tobacco: Never   Vaping Use    Vaping status: Never Used   Substance and Sexual Activity    Alcohol use: Yes     Alcohol/week: 1.0 standard drink of alcohol     Types: 1 Cans of beer per week    Drug use: No    Sexual activity: Not on file       Current Facility-Administered Medications:     acetaminophen (TYLENOL) tablet 650 mg, Q6H PRN    atorvastatin (LIPITOR) tablet 40 mg, Daily With Dinner    [Held by provider] Empagliflozin (JARDIANCE) tablet 10 mg, Daily    enoxaparin (LOVENOX) subcutaneous injection 100 mg, Once    lisinopril (ZESTRIL) tablet 10 mg, Daily    metoprolol tartrate (LOPRESSOR) tablet 25 mg, Q12H CHRISTINE    sucralfate (CARAFATE) tablet 1 g, 4x Daily  Prior to Admission Medications   Prescriptions Last Dose Informant Patient Reported? Taking?   Blood Glucose Monitoring Suppl (Contour Next One) KIT   No No   Sig: Use one test strip once daily [E11.29]   Empagliflozin (JARDIANCE) 10 MG TABS tablet   Yes No   Sig: Take 10 mg by mouth daily   Lancets (onetouch ultrasoft) lancets   No No   Sig: Use one test strip once daily [E11.29]   atorvastatin (LIPITOR) 10 mg tablet   No No   Sig: Take 1 tablet (10 mg total) by mouth daily   ciclopirox (LOPROX) 0.77 % cream   Yes No   Sig: As needed   clotrimazole-betamethasone (LOTRISONE) 1-0.05 % cream   No No   Sig: Apply topically 2 (two) times a day Short term, sparingly to area: right leg   Patient taking differently: Apply topically 2 (two) times a day Short term, sparingly to area: right leg; as needed   glucose blood (Contour Next Test) test strip   No No   Sig: Use one test strip once daily [E11.29]   lisinopril (ZESTRIL) 10 mg tablet   No No   Sig: Take 1 tablet (10 mg total) by mouth daily   sildenafil (VIAGRA) 100 mg tablet   No No   Sig: Take 1 tablet (100 mg total) by mouth daily as needed for erectile dysfunction   sucralfate (CARAFATE) 1 g/10 mL suspension   No No   Sig: Take 10 mL (1 g total) by  "mouth 4 (four) times a day      Facility-Administered Medications: None     Patient has no known allergies.    Objective :  Temp:  [98 °F (36.7 °C)] 98 °F (36.7 °C)  HR:  [62] 62  BP: (123)/(74) 123/74  Resp:  [19] 19  SpO2:  [94 %] 94 %  Orthostatic Blood Pressures      Flowsheet Row Most Recent Value   Blood Pressure 123/74 filed at 05/15/2025 1516          First Weight:  There were no vitals filed for this visit.    Physical Exam  Vitals and nursing note reviewed.   Constitutional:       General: He is not in acute distress.     Appearance: Normal appearance. He is normal weight.   HENT:      Right Ear: External ear normal.      Left Ear: External ear normal.     Eyes:      General:         Right eye: No discharge.         Left eye: No discharge.       Cardiovascular:      Rate and Rhythm: Normal rate and regular rhythm.      Pulses: Normal pulses.   Pulmonary:      Effort: Pulmonary effort is normal. No respiratory distress.      Breath sounds: Normal breath sounds.   Abdominal:      General: There is no distension.      Palpations: Abdomen is soft.     Musculoskeletal:      Right lower leg: No edema.      Left lower leg: No edema.     Skin:     General: Skin is warm and dry.      Capillary Refill: Capillary refill takes less than 2 seconds.      Coloration: Skin is not jaundiced.     Neurological:      Mental Status: He is alert and oriented to person, place, and time. Mental status is at baseline.     Psychiatric:         Mood and Affect: Mood normal.           Lab Results: I have reviewed the following results:              Lab Results   Component Value Date    HGBA1C 7.9 (H) 04/17/2024     No results found for: \"CKTOTAL\", \"CKMB\", \"CKMBINDEX\", \"TROPONINI\"    EKG performed in the emergency room demonstrated sinus bradycardia with nonspecific ST and T wave changes.    VTE Prophylaxis: VTE covered by:  enoxaparin, Subcutaneous       Cherrie PINEDA  Cardiology  "

## 2025-05-15 NOTE — ASSESSMENT & PLAN NOTE
Patient admitted to cardiology's for cardiac catheterization in a.m.  Stress test today during which she developed chest pain and noted to have ST depressions  One-time dose of weight-based Lovenox ordered by cardiology.  Continue aspirin and Lopressor was started

## 2025-05-15 NOTE — CONSULTS
"Consultation - Hospitalist   Name: Jayy Wells 65 y.o. male I MRN: [id number]  Unit/Bed#: [address] [address] I Date of Admission: 5/15/2025   Date of Service: 5/15/2025 I Hospital Day: 0   Inpatient consult to Internal Medicine  Consult performed by: Livia Magaña DO  Consult ordered by: EDWIN Gonzalez      Physician Requesting Evaluation: Josef Son MD   Reason for Evaluation / Principal Problem: Diabetes management    Assessment & Plan  Type 2 diabetes mellitus with stage 1 chronic kidney disease, without long-term current use of insulin  (HCC)  Lab Results   Component Value Date    HGBA1C 7.9 (H) 04/17/2024       No results for input(s): \"POCGLU\" in the last 72 hours.    Patient on Jardiance 25 mg daily at home along with diabetic diet   Hold Jardiance  Place patient on Accu-Cheks with SSI  Update A1c    Precordial pain  Patient admitted to cardiology's for cardiac catheterization in a.m.  Stress test today during which she developed chest pain and noted to have ST depressions  One-time dose of weight-based Lovenox ordered by cardiology.  Continue aspirin and Lopressor was started  Primary hypertension  Continue lisinopril and Lopressor added  Monitor blood pressures  CKD (chronic kidney disease) stage 1, GFR 90 ml/min or greater  Lab Results   Component Value Date    EGFR 90 05/05/2025    EGFR 97 04/17/2024    EGFR 97 10/09/2023    CREATININE 0.88 05/05/2025    CREATININE 0.87 04/17/2024    CREATININE 0.86 10/09/2023   Creatinine appears at baseline on lab work.  Repeat in a.m.  Other hyperlipidemia  Continue statin        VTE Pharmacologic Prophylaxis:   Lovenox  Code Status: Level 1 - Full Code   Discussion with family: Updated  (wife) at bedside.      History of Present Illness     Jayy Wells is a 65 y.o. male with a PMH of diabetes, hypertension who is admitted to the cardiology team for cardiac catheterization in AM.  We are being consulted for diabetes management.  Patient was " seen in the ER on 5/5 with chest pain.  Troponins were negative and patient was discharged home.  He then followed up with his PCP who ordered a stress test.  Patient had chest pain during stress test today with ST depressions noted.  Patient currently denies any chest pain, shortness of breath    Review of Systems   Constitutional:  Negative for appetite change, chills and fever.   HENT:  Negative for congestion.    Eyes:  Negative for visual disturbance.   Respiratory:  Negative for cough and shortness of breath.    Cardiovascular:  Positive for chest pain.   Gastrointestinal:  Negative for abdominal pain, diarrhea, nausea and vomiting.   Genitourinary:  Negative for dysuria, frequency and hematuria.   Musculoskeletal:  Negative for back pain.   Skin:  Negative for wound.   Neurological:  Negative for dizziness and light-headedness.   Hematological:  Does not bruise/bleed easily.   Psychiatric/Behavioral:  Negative for agitation.        Historical Information   Past Medical History:   Diagnosis Date    History of low back pain     Osteoarthritis     Tinea corporis      Past Surgical History:   Procedure Laterality Date    COLONOSCOPY      HIP SURGERY Right     WISDOM TOOTH EXTRACTION      x1     Social History     Tobacco Use    Smoking status: Never     Passive exposure: Past    Smokeless tobacco: Never   Vaping Use    Vaping status: Never Used   Substance and Sexual Activity    Alcohol use: Yes     Alcohol/week: 1.0 standard drink of alcohol     Types: 1 Cans of beer per week    Drug use: No    Sexual activity: Not on file     E-Cigarette/Vaping    E-Cigarette Use Never User      E-Cigarette/Vaping Substances    Nicotine No     THC No     CBD No     Flavoring No     Other No     Unknown No      History reviewed. No pertinent family history.  Social History:  Marital Status: /Civil Union   Patient Pre-hospital Living Situation: With spouse  Patient Pre-hospital Level of Mobility: walks  Patient  Pre-hospital Diet Restrictions: Diabetic diet    Meds/Allergies   I have reviewed home medications using recent Epic encounter.  Prior to Admission medications    Medication Sig Start Date End Date Taking? Authorizing Provider   atorvastatin (LIPITOR) 10 mg tablet Take 1 tablet (10 mg total) by mouth daily 2/11/25   Giovanni Hankins DO   Blood Glucose Monitoring Suppl (Contour Next One) KIT Use one test strip once daily [E11.29] 6/6/24   Giovanni Hankins DO   ciclopirox (LOPROX) 0.77 % cream As needed 11/1/21   Historical Provider, MD   clotrimazole-betamethasone (LOTRISONE) 1-0.05 % cream Apply topically 2 (two) times a day Short term, sparingly to area: right leg  Patient taking differently: Apply topically 2 (two) times a day Short term, sparingly to area: right leg; as needed 10/5/21   Giovanni Hankins DO   Empagliflozin (JARDIANCE) 10 MG TABS tablet Take 10 mg by mouth daily 12/23/24   Historical Provider, MD   glucose blood (Contour Next Test) test strip Use one test strip once daily [E11.29] 5/23/24   Giovanni Hankins DO   Lancets (onetouch ultrasoft) lancets Use one test strip once daily [E11.29] 5/23/24   Giovanni Hankins DO   lisinopril (ZESTRIL) 10 mg tablet Take 1 tablet (10 mg total) by mouth daily 2/11/25   Giovanni Hankins DO   sildenafil (VIAGRA) 100 mg tablet Take 1 tablet (100 mg total) by mouth daily as needed for erectile dysfunction 2/11/25   Giovanni Hankins DO   sucralfate (CARAFATE) 1 g/10 mL suspension Take 10 mL (1 g total) by mouth 4 (four) times a day 5/9/25   Giovanni Hankins DO     No Known Allergies    Objective :  Temp:  [98 °F (36.7 °C)] 98 °F (36.7 °C)  HR:  [62] 62  BP: (123)/(74) 123/74  Resp:  [19] 19  SpO2:  [94 %] 94 %    Physical Exam  Vitals reviewed.   Constitutional:       General: He is not in acute distress.     Appearance: He is not toxic-appearing.   HENT:      Head: Normocephalic and atraumatic.     Eyes:      General:         Right eye: No discharge.         Left eye:  No discharge.       Cardiovascular:      Rate and Rhythm: Normal rate and regular rhythm.   Pulmonary:      Effort: Pulmonary effort is normal. No respiratory distress.      Breath sounds: Normal breath sounds. No wheezing or rales.   Abdominal:      General: Bowel sounds are normal. There is no distension.      Palpations: Abdomen is soft.      Tenderness: There is no abdominal tenderness.     Musculoskeletal:      Comments: Trace pedal edema bilaterally     Neurological:      Mental Status: He is alert and oriented to person, place, and time.         Lines/Drains:            Lab Results: I have reviewed the following results:  Results from last 7 days   Lab Units 05/15/25  1547   PLATELETS Thousands/uL 176                 Lab Results   Component Value Date    HGBA1C 7.9 (H) 04/17/2024    HGBA1C 7.5 (H) 10/09/2023    HGBA1C 8.0 (H) 03/06/2023           Imaging Results Review: No pertinent imaging studies reviewed.  Other Study Results Review: Other studies reviewed include: stress test    Administrative Statements     ** Please Note: This note has been constructed using a voice recognition system. **

## 2025-05-15 NOTE — ASSESSMENT & PLAN NOTE
no recent lipid profile will obtain in the a.m.  will increase his home dose of Lipitor from 10 mg daily to 40 mg daily at this time

## 2025-05-15 NOTE — ASSESSMENT & PLAN NOTE
"Lab Results   Component Value Date    HGBA1C 7.9 (H) 04/17/2024       No results for input(s): \"POCGLU\" in the last 72 hours.    Blood Sugar Average: Last 72 hrs:  it has been over one year since patient's hemoglobin A1c is been checked, will recheck during this admission  managed per the hospitalist team  SGL T2 inhibitor on hold    "

## 2025-05-15 NOTE — ASSESSMENT & PLAN NOTE
Lab Results   Component Value Date    EGFR 90 05/05/2025    EGFR 97 04/17/2024    EGFR 97 10/09/2023    CREATININE 0.88 05/05/2025    CREATININE 0.87 04/17/2024    CREATININE 0.86 10/09/2023   Creatinine appears at baseline on lab work.  Repeat in a.m.

## 2025-05-15 NOTE — ASSESSMENT & PLAN NOTE
patient recently seen in the emergency room with escalating chest discomfort.  At that time, troponins were negative and EKG was unremarkable  patient seen by his PCP on 5/5/2025 and ordered for exercise nonnuclear stress test  patient experience chest discomfort with ST depression seen on exercise stress test  will obtain echocardiogram  patient will be admitted for further evaluation with left heart catheterization  will escalate his statin to intermediate intensity with Lipitor 40 mg daily  will give him Lovenox 1 mg per kilogram subcutaneously times one  will add low-dose Lopressor 25 mg BID  continue aspirin 81 mg once a day

## 2025-05-15 NOTE — PLAN OF CARE
Problem: PAIN - ADULT  Goal: Verbalizes/displays adequate comfort level or baseline comfort level  Description: Interventions:  - Encourage patient to monitor pain and request assistance  - Assess pain using appropriate pain scale  - Administer analgesics as ordered based on type and severity of pain and evaluate response  - Implement non-pharmacological measures as appropriate and evaluate response  - Consider cultural and social influences on pain and pain management  - Notify physician/advanced practitioner if interventions unsuccessful or patient reports new pain  - Educate patient/family on pain management process including their role and importance of  reporting pain   - Provide non-pharmacologic/complimentary pain relief interventions  Outcome: Progressing     Problem: INFECTION - ADULT  Goal: Absence or prevention of progression during hospitalization  Description: INTERVENTIONS:  - Assess and monitor for signs and symptoms of infection  - Monitor lab/diagnostic results  - Monitor all insertion sites, i.e. indwelling lines, tubes, and drains  - Monitor endotracheal if appropriate and nasal secretions for changes in amount and color  - Colt appropriate cooling/warming therapies per order  - Administer medications as ordered  - Instruct and encourage patient and family to use good hand hygiene technique  - Identify and instruct in appropriate isolation precautions for identified infection/condition  Outcome: Progressing  Goal: Absence of fever/infection during neutropenic period  Description: INTERVENTIONS:  - Monitor WBC  - Perform strict hand hygiene  - Limit to healthy visitors only  - No plants, dried, fresh or silk flowers with gabriel in patient room  Outcome: Progressing     Problem: SAFETY ADULT  Goal: Patient will remain free of falls  Description: INTERVENTIONS:  - Educate patient/family on patient safety including physical limitations  - Instruct patient to call for assistance with activity   -  Consider consulting OT/PT to assist with strengthening/mobility based on AM PAC & JH-HLM score  - Consult OT/PT to assist with strengthening/mobility   - Keep Call bell within reach  - Keep bed low and locked with side rails adjusted as appropriate  - Keep care items and personal belongings within reach  - Initiate and maintain comfort rounds  - Make Fall Risk Sign visible to staff  - Offer Toileting every 3 Hours, in advance of need  - Initiate/Maintain bed/chair alarm  - Obtain necessary fall risk management equipment: fall sign  - Apply yellow socks and bracelet for high fall risk patients  - Consider moving patient to room near nurses station  Outcome: Progressing  Goal: Maintain or return to baseline ADL function  Description: INTERVENTIONS:  -  Assess patient's ability to carry out ADLs; assess patient's baseline for ADL function and identify physical deficits which impact ability to perform ADLs (bathing, care of mouth/teeth, toileting, grooming, dressing, etc.)  - Assess/evaluate cause of self-care deficits   - Assess range of motion  - Assess patient's mobility; develop plan if impaired  - Assess patient's need for assistive devices and provide as appropriate  - Encourage maximum independence but intervene and supervise when necessary  - Involve family in performance of ADLs  - Assess for home care needs following discharge   - Consider OT consult to assist with ADL evaluation and planning for discharge  - Provide patient education as appropriate  - Monitor functional capacity and physical performance, use of AM PAC & JH-HLM   - Monitor gait, balance and fatigue with ambulation    Outcome: Progressing  Goal: Maintains/Returns to pre admission functional level  Description: INTERVENTIONS:  - Perform AM-PAC 6 Click Basic Mobility/ Daily Activity assessment daily.  - Set and communicate daily mobility goal to care team and patient/family/caregiver.   - Collaborate with rehabilitation services on mobility goals  if consulted  - Perform Range of Motion 3 times a day.  - Reposition patient every 3 hours.  - Dangle patient 3 times a day  - Stand patient 3 times a day  - Ambulate patient 3 times a day  - Out of bed to chair 3 times a day   - Out of bed for meals 3 times a day  - Out of bed for toileting  - Record patient progress and toleration of activity level   Outcome: Progressing     Problem: DISCHARGE PLANNING  Goal: Discharge to home or other facility with appropriate resources  Description: INTERVENTIONS:  - Identify barriers to discharge w/patient and caregiver  - Arrange for needed discharge resources and transportation as appropriate  - Identify discharge learning needs (meds, wound care, etc.)  - Arrange for interpretive services to assist at discharge as needed  - Refer to Case Management Department for coordinating discharge planning if the patient needs post-hospital services based on physician/advanced practitioner order or complex needs related to functional status, cognitive ability, or social support system  Outcome: Progressing     Problem: Knowledge Deficit  Goal: Patient/family/caregiver demonstrates understanding of disease process, treatment plan, medications, and discharge instructions  Description: Complete learning assessment and assess knowledge base.  Interventions:  - Provide teaching at level of understanding  - Provide teaching via preferred learning methods  Outcome: Progressing

## 2025-05-16 ENCOUNTER — APPOINTMENT (INPATIENT)
Dept: NON INVASIVE DIAGNOSTICS | Facility: HOSPITAL | Age: 65
DRG: 322 | End: 2025-05-16
Payer: MEDICARE

## 2025-05-16 LAB
ALBUMIN SERPL BCG-MCNC: 3.8 G/DL (ref 3.5–5)
ALP SERPL-CCNC: 38 U/L (ref 34–104)
ALT SERPL W P-5'-P-CCNC: 17 U/L (ref 7–52)
ANION GAP SERPL CALCULATED.3IONS-SCNC: 10 MMOL/L (ref 4–13)
AORTIC ROOT: 3.7 CM
AST SERPL W P-5'-P-CCNC: 13 U/L (ref 13–39)
ATRIAL RATE: 128 BPM
ATRIAL RATE: 45 BPM
ATRIAL RATE: 74 BPM
ATRIAL RATE: 81 BPM
ATRIAL RATE: 86 BPM
AV LVOT PEAK GRADIENT: 4 MMHG
BASOPHILS # BLD AUTO: 0.02 THOUSANDS/ÂΜL (ref 0–0.1)
BASOPHILS NFR BLD AUTO: 0 % (ref 0–1)
BILIRUB SERPL-MCNC: 0.45 MG/DL (ref 0.2–1)
BSA FOR ECHO PROCEDURE: 2.26 M2
BUN SERPL-MCNC: 26 MG/DL (ref 5–25)
CALCIUM SERPL-MCNC: 8.7 MG/DL (ref 8.4–10.2)
CHLORIDE SERPL-SCNC: 106 MMOL/L (ref 96–108)
CHOLEST SERPL-MCNC: 131 MG/DL (ref ?–200)
CO2 SERPL-SCNC: 22 MMOL/L (ref 21–32)
CREAT SERPL-MCNC: 0.74 MG/DL (ref 0.6–1.3)
DOP CALC LVOT AREA: 4.15 CM2
DOP CALC LVOT DIAMETER: 2.3 CM
E WAVE DECELERATION TIME: 270 MS
E/A RATIO: 0.8
EOSINOPHIL # BLD AUTO: 0.18 THOUSAND/ÂΜL (ref 0–0.61)
EOSINOPHIL NFR BLD AUTO: 3 % (ref 0–6)
ERYTHROCYTE [DISTWIDTH] IN BLOOD BY AUTOMATED COUNT: 12.9 % (ref 11.6–15.1)
FRACTIONAL SHORTENING: 35 (ref 28–44)
GFR SERPL CREATININE-BSD FRML MDRD: 96 ML/MIN/1.73SQ M
GLUCOSE SERPL-MCNC: 117 MG/DL (ref 65–140)
GLUCOSE SERPL-MCNC: 122 MG/DL (ref 65–140)
GLUCOSE SERPL-MCNC: 127 MG/DL (ref 65–140)
GLUCOSE SERPL-MCNC: 139 MG/DL (ref 65–140)
GLUCOSE SERPL-MCNC: 144 MG/DL (ref 65–140)
HCT VFR BLD AUTO: 40.2 % (ref 36.5–49.3)
HDLC SERPL-MCNC: 42 MG/DL
HGB BLD-MCNC: 13.2 G/DL (ref 12–17)
IMM GRANULOCYTES # BLD AUTO: 0.01 THOUSAND/UL (ref 0–0.2)
IMM GRANULOCYTES NFR BLD AUTO: 0 % (ref 0–2)
INTERVENTRICULAR SEPTUM IN DIASTOLE (PARASTERNAL SHORT AXIS VIEW): 1.1 CM
INTERVENTRICULAR SEPTUM: 1.1 CM (ref 0.6–1.1)
KCT BLD-ACNC: 287 SEC (ref 89–137)
LAAS-AP2: 28.9 CM2
LAAS-AP4: 23.9 CM2
LDLC SERPL CALC-MCNC: 70 MG/DL (ref 0–100)
LEFT ATRIUM SIZE: 3.9 CM
LEFT ATRIUM VOLUME (MOD BIPLANE): 86 ML
LEFT ATRIUM VOLUME INDEX (MOD BIPLANE): 38.1 ML/M2
LEFT INTERNAL DIMENSION IN SYSTOLE: 3.5 CM (ref 2.1–4)
LEFT VENTRICULAR INTERNAL DIMENSION IN DIASTOLE: 5.4 CM (ref 3.5–6)
LEFT VENTRICULAR POSTERIOR WALL IN END DIASTOLE: 0.8 CM
LEFT VENTRICULAR STROKE VOLUME: 90 ML
LV EF US.2D.A4C+ESTIMATED: 65 %
LVSV (TEICH): 90 ML
LYMPHOCYTES # BLD AUTO: 1.76 THOUSANDS/ÂΜL (ref 0.6–4.47)
LYMPHOCYTES NFR BLD AUTO: 32 % (ref 14–44)
MCH RBC QN AUTO: 30.1 PG (ref 26.8–34.3)
MCHC RBC AUTO-ENTMCNC: 32.8 G/DL (ref 31.4–37.4)
MCV RBC AUTO: 92 FL (ref 82–98)
MONOCYTES # BLD AUTO: 0.45 THOUSAND/ÂΜL (ref 0.17–1.22)
MONOCYTES NFR BLD AUTO: 8 % (ref 4–12)
MV E'TISSUE VEL-SEP: 10 CM/S
MV PEAK A VEL: 0.65 M/S
MV PEAK E VEL: 52 CM/S
MV STENOSIS PRESSURE HALF TIME: 78 MS
MV VALVE AREA P 1/2 METHOD: 2.82
NEUTROPHILS # BLD AUTO: 3.03 THOUSANDS/ÂΜL (ref 1.85–7.62)
NEUTS SEG NFR BLD AUTO: 57 % (ref 43–75)
NONHDLC SERPL-MCNC: 89 MG/DL
NRBC BLD AUTO-RTO: 0 /100 WBCS
P AXIS: 25 DEGREES
P AXIS: 57 DEGREES
P AXIS: 75 DEGREES
P AXIS: 77 DEGREES
P AXIS: 87 DEGREES
PLATELET # BLD AUTO: 151 THOUSANDS/UL (ref 149–390)
PMV BLD AUTO: 11.3 FL (ref 8.9–12.7)
POTASSIUM SERPL-SCNC: 3.7 MMOL/L (ref 3.5–5.3)
PR INTERVAL: 152 MS
PR INTERVAL: 164 MS
PR INTERVAL: 166 MS
PR INTERVAL: 176 MS
PR INTERVAL: 190 MS
PROT SERPL-MCNC: 5.9 G/DL (ref 6.4–8.4)
QRS AXIS: -10 DEGREES
QRS AXIS: -18 DEGREES
QRS AXIS: -53 DEGREES
QRS AXIS: 19 DEGREES
QRS AXIS: 24 DEGREES
QRSD INTERVAL: 106 MS
QRSD INTERVAL: 94 MS
QRSD INTERVAL: 94 MS
QRSD INTERVAL: 96 MS
QRSD INTERVAL: 96 MS
QT INTERVAL: 294 MS
QT INTERVAL: 354 MS
QT INTERVAL: 368 MS
QT INTERVAL: 390 MS
QT INTERVAL: 456 MS
QTC INTERVAL: 395 MS
QTC INTERVAL: 408 MS
QTC INTERVAL: 423 MS
QTC INTERVAL: 429 MS
QTC INTERVAL: 453 MS
RBC # BLD AUTO: 4.39 MILLION/UL (ref 3.88–5.62)
RIGHT ATRIUM AREA SYSTOLE A4C: 15.7 CM2
RIGHT VENTRICLE ID DIMENSION: 2.9 CM
SL CV LEFT ATRIUM LENGTH A2C: 6.1 CM
SL CV LV EF: 55
SL CV PED ECHO LEFT VENTRICLE DIASTOLIC VOLUME (MOD BIPLANE) 2D: 142 ML
SL CV PED ECHO LEFT VENTRICLE SYSTOLIC VOLUME (MOD BIPLANE) 2D: 52 ML
SODIUM SERPL-SCNC: 138 MMOL/L (ref 135–147)
SPECIMEN SOURCE: ABNORMAL
T WAVE AXIS: 46 DEGREES
T WAVE AXIS: 57 DEGREES
T WAVE AXIS: 58 DEGREES
T WAVE AXIS: 65 DEGREES
T WAVE AXIS: 70 DEGREES
TR MAX PG: 16 MMHG
TR PEAK VELOCITY: 2 M/S
TRICUSPID ANNULAR PLANE SYSTOLIC EXCURSION: 2.1 CM
TRICUSPID VALVE PEAK REGURGITATION VELOCITY: 1.97 M/S
TRIGL SERPL-MCNC: 95 MG/DL (ref ?–150)
VENTRICULAR RATE: 128 BPM
VENTRICULAR RATE: 45 BPM
VENTRICULAR RATE: 74 BPM
VENTRICULAR RATE: 81 BPM
VENTRICULAR RATE: 86 BPM
WBC # BLD AUTO: 5.45 THOUSAND/UL (ref 4.31–10.16)

## 2025-05-16 PROCEDURE — 99152 MOD SED SAME PHYS/QHP 5/>YRS: CPT | Performed by: STUDENT IN AN ORGANIZED HEALTH CARE EDUCATION/TRAINING PROGRAM

## 2025-05-16 PROCEDURE — 93306 TTE W/DOPPLER COMPLETE: CPT | Performed by: INTERNAL MEDICINE

## 2025-05-16 PROCEDURE — C1769 GUIDE WIRE: HCPCS | Performed by: STUDENT IN AN ORGANIZED HEALTH CARE EDUCATION/TRAINING PROGRAM

## 2025-05-16 PROCEDURE — 99232 SBSQ HOSP IP/OBS MODERATE 35: CPT | Performed by: INTERNAL MEDICINE

## 2025-05-16 PROCEDURE — 4A023N7 MEASUREMENT OF CARDIAC SAMPLING AND PRESSURE, LEFT HEART, PERCUTANEOUS APPROACH: ICD-10-PCS | Performed by: STUDENT IN AN ORGANIZED HEALTH CARE EDUCATION/TRAINING PROGRAM

## 2025-05-16 PROCEDURE — C1874 STENT, COATED/COV W/DEL SYS: HCPCS | Performed by: STUDENT IN AN ORGANIZED HEALTH CARE EDUCATION/TRAINING PROGRAM

## 2025-05-16 PROCEDURE — 92928 PRQ TCAT PLMT NTRAC ST 1 LES: CPT | Performed by: STUDENT IN AN ORGANIZED HEALTH CARE EDUCATION/TRAINING PROGRAM

## 2025-05-16 PROCEDURE — 93458 L HRT ARTERY/VENTRICLE ANGIO: CPT | Performed by: STUDENT IN AN ORGANIZED HEALTH CARE EDUCATION/TRAINING PROGRAM

## 2025-05-16 PROCEDURE — C9600 PERC DRUG-EL COR STENT SING: HCPCS | Performed by: STUDENT IN AN ORGANIZED HEALTH CARE EDUCATION/TRAINING PROGRAM

## 2025-05-16 PROCEDURE — C1894 INTRO/SHEATH, NON-LASER: HCPCS | Performed by: STUDENT IN AN ORGANIZED HEALTH CARE EDUCATION/TRAINING PROGRAM

## 2025-05-16 PROCEDURE — 82948 REAGENT STRIP/BLOOD GLUCOSE: CPT

## 2025-05-16 PROCEDURE — 93005 ELECTROCARDIOGRAM TRACING: CPT

## 2025-05-16 PROCEDURE — NC001 PR NO CHARGE: Performed by: NURSE PRACTITIONER

## 2025-05-16 PROCEDURE — C1725 CATH, TRANSLUMIN NON-LASER: HCPCS | Performed by: STUDENT IN AN ORGANIZED HEALTH CARE EDUCATION/TRAINING PROGRAM

## 2025-05-16 PROCEDURE — 80061 LIPID PANEL: CPT | Performed by: NURSE PRACTITIONER

## 2025-05-16 PROCEDURE — 93010 ELECTROCARDIOGRAM REPORT: CPT | Performed by: INTERNAL MEDICINE

## 2025-05-16 PROCEDURE — 80053 COMPREHEN METABOLIC PANEL: CPT | Performed by: NURSE PRACTITIONER

## 2025-05-16 PROCEDURE — 93306 TTE W/DOPPLER COMPLETE: CPT

## 2025-05-16 PROCEDURE — 85025 COMPLETE CBC W/AUTO DIFF WBC: CPT | Performed by: NURSE PRACTITIONER

## 2025-05-16 PROCEDURE — 85347 COAGULATION TIME ACTIVATED: CPT

## 2025-05-16 PROCEDURE — C1887 CATHETER, GUIDING: HCPCS | Performed by: STUDENT IN AN ORGANIZED HEALTH CARE EDUCATION/TRAINING PROGRAM

## 2025-05-16 PROCEDURE — 99153 MOD SED SAME PHYS/QHP EA: CPT | Performed by: STUDENT IN AN ORGANIZED HEALTH CARE EDUCATION/TRAINING PROGRAM

## 2025-05-16 PROCEDURE — B2111ZZ FLUOROSCOPY OF MULTIPLE CORONARY ARTERIES USING LOW OSMOLAR CONTRAST: ICD-10-PCS | Performed by: STUDENT IN AN ORGANIZED HEALTH CARE EDUCATION/TRAINING PROGRAM

## 2025-05-16 PROCEDURE — 027034Z DILATION OF CORONARY ARTERY, ONE ARTERY WITH DRUG-ELUTING INTRALUMINAL DEVICE, PERCUTANEOUS APPROACH: ICD-10-PCS | Performed by: STUDENT IN AN ORGANIZED HEALTH CARE EDUCATION/TRAINING PROGRAM

## 2025-05-16 DEVICE — STENT ONYXNG35022UX ONYX 3.50X22RX
Type: IMPLANTABLE DEVICE | Site: HEART | Status: FUNCTIONAL
Brand: ONYX FRONTIER™

## 2025-05-16 RX ORDER — HEPARIN SODIUM 1000 [USP'U]/ML
INJECTION, SOLUTION INTRAVENOUS; SUBCUTANEOUS CODE/TRAUMA/SEDATION MEDICATION
Status: DISCONTINUED | OUTPATIENT
Start: 2025-05-16 | End: 2025-05-16 | Stop reason: HOSPADM

## 2025-05-16 RX ORDER — METOPROLOL SUCCINATE 25 MG/1
25 TABLET, EXTENDED RELEASE ORAL
Status: DISCONTINUED | OUTPATIENT
Start: 2025-05-16 | End: 2025-05-17 | Stop reason: HOSPADM

## 2025-05-16 RX ORDER — FENTANYL CITRATE 50 UG/ML
INJECTION, SOLUTION INTRAMUSCULAR; INTRAVENOUS CODE/TRAUMA/SEDATION MEDICATION
Status: DISCONTINUED | OUTPATIENT
Start: 2025-05-16 | End: 2025-05-16 | Stop reason: HOSPADM

## 2025-05-16 RX ORDER — ATORVASTATIN CALCIUM 40 MG/1
40 TABLET, FILM COATED ORAL
Qty: 90 TABLET | Refills: 1 | Status: SHIPPED | OUTPATIENT
Start: 2025-05-16 | End: 2025-05-21 | Stop reason: SDUPTHER

## 2025-05-16 RX ORDER — VERAPAMIL HCL 2.5 MG/ML
AMPUL (ML) INTRAVENOUS CODE/TRAUMA/SEDATION MEDICATION
Status: DISCONTINUED | OUTPATIENT
Start: 2025-05-16 | End: 2025-05-16 | Stop reason: HOSPADM

## 2025-05-16 RX ORDER — METOPROLOL SUCCINATE 25 MG/1
25 TABLET, EXTENDED RELEASE ORAL
Qty: 90 TABLET | Refills: 1 | Status: SHIPPED | OUTPATIENT
Start: 2025-05-16 | End: 2025-05-21 | Stop reason: SDUPTHER

## 2025-05-16 RX ORDER — MIDAZOLAM HYDROCHLORIDE 2 MG/2ML
INJECTION, SOLUTION INTRAMUSCULAR; INTRAVENOUS CODE/TRAUMA/SEDATION MEDICATION
Status: DISCONTINUED | OUTPATIENT
Start: 2025-05-16 | End: 2025-05-16 | Stop reason: HOSPADM

## 2025-05-16 RX ORDER — LIDOCAINE WITH 8.4% SOD BICARB 0.9%(10ML)
SYRINGE (ML) INJECTION CODE/TRAUMA/SEDATION MEDICATION
Status: DISCONTINUED | OUTPATIENT
Start: 2025-05-16 | End: 2025-05-16 | Stop reason: HOSPADM

## 2025-05-16 RX ORDER — CLOPIDOGREL BISULFATE 75 MG/1
TABLET ORAL CODE/TRAUMA/SEDATION MEDICATION
Status: DISCONTINUED | OUTPATIENT
Start: 2025-05-16 | End: 2025-05-16 | Stop reason: HOSPADM

## 2025-05-16 RX ORDER — PANTOPRAZOLE SODIUM 40 MG/1
40 TABLET, DELAYED RELEASE ORAL
Qty: 90 TABLET | Refills: 1 | Status: SHIPPED | OUTPATIENT
Start: 2025-05-17

## 2025-05-16 RX ORDER — ACETAMINOPHEN 325 MG/1
650 TABLET ORAL EVERY 6 HOURS PRN
Start: 2025-05-16 | End: 2025-05-21

## 2025-05-16 RX ORDER — ASPIRIN 81 MG/1
81 TABLET, CHEWABLE ORAL DAILY
Start: 2025-05-16

## 2025-05-16 RX ORDER — CLOPIDOGREL BISULFATE 75 MG/1
75 TABLET ORAL DAILY
Status: DISCONTINUED | OUTPATIENT
Start: 2025-05-17 | End: 2025-05-16

## 2025-05-16 RX ORDER — PANTOPRAZOLE SODIUM 40 MG/1
40 TABLET, DELAYED RELEASE ORAL
Status: DISCONTINUED | OUTPATIENT
Start: 2025-05-17 | End: 2025-05-17 | Stop reason: HOSPADM

## 2025-05-16 RX ORDER — ASPIRIN 81 MG/1
TABLET, CHEWABLE ORAL CODE/TRAUMA/SEDATION MEDICATION
Status: DISCONTINUED | OUTPATIENT
Start: 2025-05-16 | End: 2025-05-16 | Stop reason: HOSPADM

## 2025-05-16 RX ADMIN — ATORVASTATIN CALCIUM 40 MG: 40 TABLET, FILM COATED ORAL at 15:52

## 2025-05-16 RX ADMIN — ACETAMINOPHEN 650 MG: 325 TABLET, FILM COATED ORAL at 15:55

## 2025-05-16 RX ADMIN — SUCRALFATE 1 G: 1 TABLET ORAL at 18:09

## 2025-05-16 RX ADMIN — SUCRALFATE 1 G: 1 TABLET ORAL at 21:40

## 2025-05-16 RX ADMIN — TICAGRELOR 90 MG: 90 TABLET ORAL at 21:40

## 2025-05-16 RX ADMIN — METOPROLOL SUCCINATE 25 MG: 25 TABLET, EXTENDED RELEASE ORAL at 21:40

## 2025-05-16 RX ADMIN — SUCRALFATE 1 G: 1 TABLET ORAL at 11:39

## 2025-05-16 NOTE — ASSESSMENT & PLAN NOTE
Lab Results   Component Value Date    HGBA1C 7.3 (H) 05/15/2025       Recent Labs     05/15/25  2011 05/16/25  0718   POCGLU 157* 122       Blood Sugar Average: Last 72 hrs:  (P) 139.5  managed per the hospitalist team  SGL T2 inhibitor on hold

## 2025-05-16 NOTE — ASSESSMENT & PLAN NOTE
Patient with direct admission secondary to symptomatic stress today as outpatient with ST depressions noted on ECG  S/p therapeutic Lovenox dosing  Continue ASA, statin. Lopressor was started  Follow up echocardiogram  Plan for cardiac catheterization today 5/16/25  Monitor on telemetry  Currently under Cardiology service, remainder of care per their team

## 2025-05-16 NOTE — PROGRESS NOTES
Progress Note - Hospitalist   Name: Jayy Wells 65 y.o. male I MRN: [id number]  Unit/Bed#: [facility] LAB ROOM I Date of Admission: 5/15/2025   Date of Service: 5/16/2025 I Hospital Day: 1    Assessment & Plan  Precordial pain  Patient with direct admission secondary to symptomatic stress today as outpatient with ST depressions noted on ECG  S/p therapeutic Lovenox dosing  Continue ASA, statin. Lopressor was started  Follow up echocardiogram  Plan for cardiac catheterization today 5/16/25  Monitor on telemetry  Currently under Cardiology service, remainder of care per their team  Type 2 diabetes mellitus with stage 1 chronic kidney disease, without long-term current use of insulin  (Formerly [facility])  Lab Results   Component Value Date    HGBA1C 7.3 (H) 05/15/2025       Recent Labs     05/15/25  2011 05/16/25  0718   POCGLU 157* 122       Repeat HA1C 7.3  Hold Jardiance during hospitalization, resume at discharge  SSI with accu checks  Diabetic diet  Follow up with PCP    Primary hypertension  BP acceptable  Continue lisinopril 10mg QD and metoprolol tartrate 25mg BID  Monitor vitals per routine  CKD (chronic kidney disease) stage 1, GFR 90 ml/min or greater  Lab Results   Component Value Date    EGFR 96 05/16/2025    EGFR 95 05/15/2025    EGFR 90 05/05/2025    CREATININE 0.74 05/16/2025    CREATININE 0.77 05/15/2025    CREATININE 0.88 05/05/2025   Creatinine appears at baseline  Avoid hypotension and nephrotoxic agents  Monitor daily BMP    Other hyperlipidemia  Continue statin    VTE Pharmacologic Prophylaxis:   Moderate Risk (Score 3-4) - Pharmacological DVT Prophylaxis Ordered: enoxaparin (Lovenox).    Mobility:   Basic Mobility Inpatient Raw Score: 24  JH-HLM Goal: 8: Walk 250 feet or more  JH-HLM Achieved: 7: Walk 25 feet or more  JH-HLM Goal achieved. Continue to encourage appropriate mobility.    Patient Centered Rounds: I performed bedside rounds with nursing staff today.   Discussions with Specialists or Other Care  Team Provider: Nursing, Cardiology    Education and Discussions with Family / Patient: Per primary team .     Current Length of Stay: 1 day(s)  Current Patient Status: Inpatient   Certification Statement: The patient will continue to require additional inpatient hospital stay due to cardiac catheterization  Discharge Plan: Anticipate discharge later today or tomorrow to home.    Code Status: Level 1 - Full Code    Subjective   Patient resting comfortably in bed. No acute overnight events.     Objective :  Temp:  [97.4 °F (36.3 °C)-98 °F (36.7 °C)] 97.8 °F (36.6 °C)  HR:  [54-62] 54  BP: (120-123)/(74-76) 123/74  Resp:  [18-22] 18  SpO2:  [94 %-100 %] 96 %  O2 Device: None (Room air)    Body mass index is 28.25 kg/m².     Input and Output Summary (last 24 hours):   No intake or output data in the 24 hours ending 05/16/25 0908    Physical Exam  Vitals and nursing note reviewed.   Constitutional:       General: He is not in acute distress.     Appearance: He is well-developed.   HENT:      Head: Normocephalic and atraumatic.     Eyes:      Conjunctiva/sclera: Conjunctivae normal.       Cardiovascular:      Rate and Rhythm: Normal rate and regular rhythm.      Heart sounds: No murmur heard.  Pulmonary:      Effort: Pulmonary effort is normal. No respiratory distress.      Breath sounds: Normal breath sounds.   Abdominal:      Palpations: Abdomen is soft.      Tenderness: There is no abdominal tenderness.     Musculoskeletal:         General: No swelling.      Cervical back: Neck supple.     Skin:     General: Skin is warm and dry.     Neurological:      Mental Status: He is alert.     Psychiatric:         Mood and Affect: Mood normal.           Lines/Drains:        Telemetry:  Telemetry Orders (From admission, onward)               24 Hour Telemetry Monitoring  Continuous x 24 Hours (Telem)        Expiring   Question:  Reason for 24 Hour Telemetry  Answer:  Arrhythmias requiring acute medical intervention / PPM or ICD  malfunction                                  Lab Results: I have reviewed the following results:   Results from last 7 days   Lab Units 05/16/25  0445   WBC Thousand/uL 5.45   HEMOGLOBIN g/dL 13.2   HEMATOCRIT % 40.2   PLATELETS Thousands/uL 151   SEGS PCT % 57   LYMPHO PCT % 32   MONO PCT % 8   EOS PCT % 3     Results from last 7 days   Lab Units 05/16/25  0445   SODIUM mmol/L 138   POTASSIUM mmol/L 3.7   CHLORIDE mmol/L 106   CO2 mmol/L 22   BUN mg/dL 26*   CREATININE mg/dL 0.74   ANION GAP mmol/L 10   CALCIUM mg/dL 8.7   ALBUMIN g/dL 3.8   TOTAL BILIRUBIN mg/dL 0.45   ALK PHOS U/L 38   ALT U/L 17   AST U/L 13   GLUCOSE RANDOM mg/dL 117         Results from last 7 days   Lab Units 05/16/25  0718 05/15/25  2011   POC GLUCOSE mg/dl 122 157*     Results from last 7 days   Lab Units 05/15/25  1547   HEMOGLOBIN A1C % 7.3*           Recent Cultures (last 7 days):         Imaging Results Review: I reviewed radiology reports from this admission including: Echocardiogram.  Other Study Results Review: EKG was reviewed.     Last 24 Hours Medication List:     Current Facility-Administered Medications:     [Transfer Hold] acetaminophen (TYLENOL) tablet 650 mg, Q6H PRN    [Transfer Hold] aspirin chewable tablet 81 mg, Daily    [Transfer Hold] atorvastatin (LIPITOR) tablet 40 mg, Daily With Dinner    [Held by provider] Empagliflozin (JARDIANCE) tablet 10 mg, Daily    [Transfer Hold] insulin lispro (HumALOG/ADMELOG) 100 units/mL subcutaneous injection 1-5 Units, TID AC **AND** Fingerstick Glucose (POCT), TID AC    [Transfer Hold] insulin lispro (HumALOG/ADMELOG) 100 units/mL subcutaneous injection 1-5 Units, HS    [Transfer Hold] lisinopril (ZESTRIL) tablet 10 mg, Daily    [Transfer Hold] metoprolol tartrate (LOPRESSOR) tablet 25 mg, Q12H CHRISTINE    [Transfer Hold] sucralfate (CARAFATE) tablet 1 g, 4x Daily    Administrative Statements   Today, Patient Was Seen By: Belinda Cooper PA-C  I have spent a total time of 30 minutes in caring  for this patient on the day of the visit/encounter including Diagnostic results, Risks and benefits of tx options, Instructions for management, Patient and family education, Importance of tx compliance, Counseling / Coordination of care, Documenting in the medical record, Reviewing/placing orders in the medical record (including tests, medications, and/or procedures), Obtaining or reviewing history  , and Communicating with other healthcare professionals .    **Please Note: This note may have been constructed using a voice recognition system.**

## 2025-05-16 NOTE — PLAN OF CARE
Problem: PAIN - ADULT  Goal: Verbalizes/displays adequate comfort level or baseline comfort level  Description: Interventions:  - Encourage patient to monitor pain and request assistance  - Assess pain using appropriate pain scale  - Administer analgesics as ordered based on type and severity of pain and evaluate response  - Implement non-pharmacological measures as appropriate and evaluate response  - Consider cultural and social influences on pain and pain management  - Notify physician/advanced practitioner if interventions unsuccessful or patient reports new pain  - Educate patient/family on pain management process including their role and importance of  reporting pain   - Provide non-pharmacologic/complimentary pain relief interventions  Outcome: Progressing     Problem: INFECTION - ADULT  Goal: Absence or prevention of progression during hospitalization  Description: INTERVENTIONS:  - Assess and monitor for signs and symptoms of infection  - Monitor lab/diagnostic results  - Monitor all insertion sites, i.e. indwelling lines, tubes, and drains  - Monitor endotracheal if appropriate and nasal secretions for changes in amount and color  - [place] appropriate cooling/warming therapies per order  - Administer medications as ordered  - Instruct and encourage patient and family to use good hand hygiene technique  - Identify and instruct in appropriate isolation precautions for identified infection/condition  Outcome: Progressing  Goal: Absence of fever/infection during neutropenic period  Description: INTERVENTIONS:  - Monitor WBC  - Perform strict hand hygiene  - Limit to healthy visitors only  - No plants, dried, fresh or silk flowers with gabriel in patient room  Outcome: Progressing     Problem: SAFETY ADULT  Goal: Patient will remain free of falls  Description: INTERVENTIONS:  - Educate patient/family on patient safety including physical limitations  - Instruct patient to call for assistance with activity   -  Consider consulting OT/PT to assist with strengthening/mobility based on AM PAC & JH-HLM score  - Consult OT/PT to assist with strengthening/mobility   - Keep Call bell within reach  - Keep bed low and locked with side rails adjusted as appropriate  - Keep care items and personal belongings within reach  - Initiate and maintain comfort rounds    - Apply yellow socks and bracelet for high fall risk patients  - Consider moving patient to room near nurses station  Outcome: Progressing  Goal: Maintain or return to baseline ADL function  Description: INTERVENTIONS:  -  Assess patient's ability to carry out ADLs; assess patient's baseline for ADL function and identify physical deficits which impact ability to perform ADLs (bathing, care of mouth/teeth, toileting, grooming, dressing, etc.)  - Assess/evaluate cause of self-care deficits   - Assess range of motion  - Assess patient's mobility; develop plan if impaired  - Assess patient's need for assistive devices and provide as appropriate  - Encourage maximum independence but intervene and supervise when necessary  - Involve family in performance of ADLs  - Assess for home care needs following discharge   - Consider OT consult to assist with ADL evaluation and planning for discharge  - Provide patient education as appropriate  - Monitor functional capacity and physical performance, use of AM PAC & JH-HLM   - Monitor gait, balance and fatigue with ambulation    Outcome: Progressing  Goal: Maintains/Returns to pre admission functional level  Description: INTERVENTIONS:  - Perform AM-PAC 6 Click Basic Mobility/ Daily Activity assessment daily.  - Set and communicate daily mobility goal to care team and patient/family/caregiver.   - Collaborate with rehabilitation services on mobility goals if consulted  Problem: DISCHARGE PLANNING  Goal: Discharge to home or other facility with appropriate resources  Description: INTERVENTIONS:  - Identify barriers to discharge w/patient and  caregiver  - Arrange for needed discharge resources and transportation as appropriate  - Identify discharge learning needs (meds, wound care, etc.)  - Arrange for interpretive services to assist at discharge as needed  - Refer to Case Management Department for coordinating discharge planning if the patient needs post-hospital services based on physician/advanced practitioner order or complex needs related to functional status, cognitive ability, or social support system  Outcome: Progressing     Problem: Knowledge Deficit  Goal: Patient/family/caregiver demonstrates understanding of disease process, treatment plan, medications, and discharge instructions  Description: Complete learning assessment and assess knowledge base.  Interventions:  - Provide teaching at level of understanding  - Provide teaching via preferred learning methods  Outcome: Progressing     - Out of bed for toileting  - Record patient progress and toleration of activity level   Outcome: Progressing

## 2025-05-16 NOTE — DISCHARGE INSTR - AVS FIRST PAGE
Post cardiac catheterization/PCI stent discharge instructions    Activity limitations for 24 hours post discharge      -  have someone stay with you for the 1st 24 hours while your home due to receiving sedation      -  DO NOT lift anything greater than 10 lb (account of milkweighs about 10 lb)  with affected arm      -  if the groin was used for access, avoid steps if possible.  If you must climb steps, DO NOT start with the leg that was used for the test.      -  if the wrist approach (radial artery) was used limit the use of that hand for the 1st 6 hours and no lifting anything greater than 10 lb with the affected hand and arm for 24 hours after discharge      -  DO NOT drive a car for 24 hours after discharge due to sedation.    Site Care      -  keep the site clean and dry.  Replaced a Band-Aid immediately if becomes wet.      -  may remove bandage and shower 24 hours after your cardiac catheterization      -  a slight tenderness or achy feeling and marble-sized lump at the site may occur.  Call cardiologist if you note this area getting larger.      -  over-the-counter pain medications may help to relieve your discomfort.    Call your doctor if you have any of the following:      -  redness, swelling, heat and/or drainage from the site      -  chills or fever with temperature greater than 100.5° F      -  any pain not relieved by medication      -  paleness, numbness, tingling and/or coldness of the leg or arm or wrist that was used for the test      -  shortness breath      -  swelling of the leg (arm rest) that was used for the test      -  a growing lump or hardness at the site.    Chest pain    Call your doctor if he have any of the following      -  any chest pain that has not been relieved with nitroglycerin      -  a change in the normal pattern of your chest pain (more frequent or longer lasting pain, or pain brought on by a different situation and usual).      -  call 911 if:            -  pain is  worse than usual or you have more symptoms            -  pain occurs more frequently or last longer            -  pain returns after nitroglycerin as prescribed    Do not stop your Aspirin and/or Brilinta without first speaking with the Cardiologist.  Premature discontinuation can lead to stent thrombosis and other problems with stent    Is important to tell your doctor and others involved in your healthcare if your taking or have been taking any nonprescription medications, vitamins, minerals, herbal supplements or other nutritional supplements.  Any of these may interact with some of your foods, medicines or cause a reaction.

## 2025-05-16 NOTE — ASSESSMENT & PLAN NOTE
Lab Results   Component Value Date    EGFR 96 05/16/2025    EGFR 95 05/15/2025    EGFR 90 05/05/2025    CREATININE 0.74 05/16/2025    CREATININE 0.77 05/15/2025    CREATININE 0.88 05/05/2025   Creatinine appears at baseline  Avoid hypotension and nephrotoxic agents  Monitor daily BMP

## 2025-05-16 NOTE — ASSESSMENT & PLAN NOTE
Lab Results   Component Value Date    HGBA1C 7.3 (H) 05/15/2025       Recent Labs     05/15/25  2011 05/16/25  0718   POCGLU 157* 122       Repeat HA1C 7.3  Hold Jardiance during hospitalization, resume at discharge  SSI with accu checks  Diabetic diet  Follow up with PCP

## 2025-05-16 NOTE — ASSESSMENT & PLAN NOTE
Lab Results   Component Value Date    EGFR 96 05/16/2025    EGFR 95 05/15/2025    EGFR 90 05/05/2025    CREATININE 0.74 05/16/2025    CREATININE 0.77 05/15/2025    CREATININE 0.88 05/05/2025   appears to be at baseline  continue to monitor

## 2025-05-16 NOTE — PROGRESS NOTES
Progress Note - Cardiology   Name: Jayy Wells 65 y.o. male I MRN: [id number]  Unit/Bed#: [address] [place] [address]-01 I Date of Admission: 5/15/2025   Date of Service: 5/16/2025 I Hospital Day: 1    Assessment & Plan  Precordial pain  patient recently seen in the emergency room with escalating chest discomfort.  At that time, troponins were negative and EKG was unremarkable  patient seen by his PCP on 5/5/2025 and ordered for exercise nonnuclear stress test  patient experience chest discomfort with ST depression seen on exercise stress test  5/16/2025 TTE: pending  5/16/2025 LHC: proximal LAD with 90% stenosis with mild luminal irregularities seen in the RCA  5/16/2025 PCI: patient underwent implantation of a 3 x 15 mm euphora drug-eluting stent  continue aspirin 81 mg once a day  patient was given Plavix 600 mg PO times one in the Cath Lab  will start Brilinta 90 mg bid this evening  continue Lipitor 40 mg daily  continue Toprol-XL 25 mg at bedtime  Continue lisinopril 10 mg once a day    Type 2 diabetes mellitus with stage 1 chronic kidney disease, without long-term current use of insulin  ([facility])  Lab Results   Component Value Date    HGBA1C 7.3 (H) 05/15/2025       Recent Labs     05/15/25  2011 05/16/25  0718   POCGLU 157* 122       Blood Sugar Average: Last 72 hrs:  (P) 139.5  managed per the hospitalist team  SGL T2 inhibitor on hold    Primary hypertension  blood pressures currently stable  continue lisinopril 10 mg daily    CKD (chronic kidney disease) stage 1, GFR 90 ml/min or greater  Lab Results   Component Value Date    EGFR 96 05/16/2025    EGFR 95 05/15/2025    EGFR 90 05/05/2025    CREATININE 0.74 05/16/2025    CREATININE 0.77 05/15/2025    CREATININE 0.88 05/05/2025   appears to be at baseline  continue to monitor  Other hyperlipidemia  no recent lipid profile will obtain in the a.m.  will increase his home dose of Lipitor from 10 mg daily to 40 mg daily at this time    Subjective   Chief Complaint: Patient  seen and examined. No further complaints of chest discomfort. Underwent intervention to LAD. Will continue to monitor    Objective :  Temp:  [97.4 °F (36.3 °C)-98 °F (36.7 °C)] 97.8 °F (36.6 °C)  HR:  [54-62] 54  BP: (120-123)/(74-76) 123/74  Resp:  [18-22] 18  SpO2:  [94 %-100 %] 96 %  O2 Device: Nasal cannula  Nasal Cannula O2 Flow Rate (L/min):  [2 L/min] 2 L/min  Orthostatic Blood Pressures      Flowsheet Row Most Recent Value   Blood Pressure 123/74 filed at 05/16/2025 0743   Patient Position - Orthostatic VS Lying filed at 05/16/2025 0743          First Weight: Weight - Scale: 99.8 kg (220 lb) (05/15/25 1516)  Vitals:    05/15/25 1516   Weight: 99.8 kg (220 lb)     Physical Exam  Vitals and nursing note reviewed.   Constitutional:       General: He is not in acute distress.     Appearance: Normal appearance. He is obese.   HENT:      Right Ear: External ear normal.      Left Ear: External ear normal.     Eyes:      General:         Right eye: No discharge.         Left eye: No discharge.       Cardiovascular:      Rate and Rhythm: Regular rhythm. Bradycardia present.      Pulses: Normal pulses.   Pulmonary:      Effort: Pulmonary effort is normal. No respiratory distress.      Breath sounds: Normal breath sounds.   Abdominal:      General: There is no distension.      Palpations: Abdomen is soft.     Musculoskeletal:      Right lower leg: No edema.      Left lower leg: No edema.     Skin:     General: Skin is warm and dry.      Capillary Refill: Capillary refill takes less than 2 seconds.     Neurological:      Mental Status: He is alert and oriented to person, place, and time. Mental status is at baseline.     Psychiatric:         Mood and Affect: Mood normal.           Lab Results: I have reviewed the following results:  Results from last 7 days   Lab Units 05/16/25  0445 05/15/25  1547   WBC Thousand/uL 5.45  --    HEMOGLOBIN g/dL 13.2  --    HEMATOCRIT % 40.2  --    PLATELETS Thousands/uL 151 176  "    Results from last 7 days   Lab Units 05/16/25  0445 05/15/25  1547   POTASSIUM mmol/L 3.7 3.9   CHLORIDE mmol/L 106 104   CO2 mmol/L 22 23   BUN mg/dL 26* 24   CREATININE mg/dL 0.74 0.77   CALCIUM mg/dL 8.7 9.2         Lab Results   Component Value Date    HGBA1C 7.3 (H) 05/15/2025     No results found for: \"CKTOTAL\", \"CKMB\", \"CKMBINDEX\", \"TROPONINI\"    Sinus rhythm/sinus bradycardia on telemetry    VTE Pharmacologic Prophylaxis: VTE covered by:    None     VTE Mechanical Prophylaxis: sequential compression device    Cherrie PINEDA  Cardiology  "

## 2025-05-16 NOTE — PLAN OF CARE
Problem: PAIN - ADULT  Goal: Verbalizes/displays adequate comfort level or baseline comfort level  Description: Interventions:  - Encourage patient to monitor pain and request assistance  - Assess pain using appropriate pain scale  - Administer analgesics as ordered based on type and severity of pain and evaluate response  - Implement non-pharmacological measures as appropriate and evaluate response  - Consider cultural and social influences on pain and pain management  - Notify physician/advanced practitioner if interventions unsuccessful or patient reports new pain  - Educate patient/family on pain management process including their role and importance of  reporting pain   - Provide non-pharmacologic/complimentary pain relief interventions  Outcome: Progressing     Problem: INFECTION - ADULT  Goal: Absence or prevention of progression during hospitalization  Description: INTERVENTIONS:  - Assess and monitor for signs and symptoms of infection  - Monitor lab/diagnostic results  - Monitor all insertion sites, i.e. indwelling lines, tubes, and drains  - Monitor endotracheal if appropriate and nasal secretions for changes in amount and color  - [place] appropriate cooling/warming therapies per order  - Administer medications as ordered  - Instruct and encourage patient and family to use good hand hygiene technique  - Identify and instruct in appropriate isolation precautions for identified infection/condition  Outcome: Progressing  Goal: Absence of fever/infection during neutropenic period  Description: INTERVENTIONS:  - Monitor WBC  - Perform strict hand hygiene  - Limit to healthy visitors only  - No plants, dried, fresh or silk flowers with gabriel in patient room  Outcome: Progressing     Goal: Maintain or return to baseline ADL function  Description: INTERVENTIONS:  -  Assess patient's ability to carry out ADLs; assess patient's baseline for ADL function and identify physical deficits which impact ability to  perform ADLs (bathing, care of mouth/teeth, toileting, grooming, dressing, etc.)  - Assess/evaluate cause of self-care deficits   - Assess range of motion  - Assess patient's mobility; develop plan if impaired  - Assess patient's need for assistive devices and provide as appropriate  - Encourage maximum independence but intervene and supervise when necessary  - Involve family in performance of ADLs  - Assess for home care needs following discharge   - Consider OT consult to assist with ADL evaluation and planning for discharge  - Provide patient education as appropriate  - Monitor functional capacity and physical performance, use of AM PAC & JH-HLM   - Monitor gait, balance and fatigue with ambulation         Problem: DISCHARGE PLANNING  Goal: Discharge to home or other facility with appropriate resources  Description: INTERVENTIONS:  - Identify barriers to discharge w/patient and caregiver  - Arrange for needed discharge resources and transportation as appropriate  - Identify discharge learning needs (meds, wound care, etc.)  - Arrange for interpretive services to assist at discharge as needed  - Refer to Case Management Department for coordinating discharge planning if the patient needs post-hospital services based on physician/advanced practitioner order or complex needs related to functional status, cognitive ability, or social support system  Outcome: Progressing     Problem: Knowledge Deficit  Goal: Patient/family/caregiver demonstrates understanding of disease process, treatment plan, medications, and discharge instructions  Description: Complete learning assessment and assess knowledge base.  Interventions:  - Provide teaching at level of understanding  - Provide teaching via preferred learning methods  Outcome: Progressing

## 2025-05-16 NOTE — ASSESSMENT & PLAN NOTE
BP acceptable  Continue lisinopril 10mg QD and metoprolol tartrate 25mg BID  Monitor vitals per routine

## 2025-05-16 NOTE — PLAN OF CARE
Problem: PAIN - ADULT  Goal: Verbalizes/displays adequate comfort level or baseline comfort level  Description: Interventions:  - Encourage patient to monitor pain and request assistance  - Assess pain using appropriate pain scale  - Administer analgesics as ordered based on type and severity of pain and evaluate response  - Implement non-pharmacological measures as appropriate and evaluate response  - Consider cultural and social influences on pain and pain management  - Notify physician/advanced practitioner if interventions unsuccessful or patient reports new pain  - Educate patient/family on pain management process including their role and importance of  reporting pain   - Provide non-pharmacologic/complimentary pain relief interventions  5/16/2025 0508 by Terry Tejada RN  Outcome: Progressing  5/15/2025 2249 by Terry Tejada RN  Outcome: Progressing     Problem: INFECTION - ADULT  Goal: Absence or prevention of progression during hospitalization  Description: INTERVENTIONS:  - Assess and monitor for signs and symptoms of infection  - Monitor lab/diagnostic results  - Monitor all insertion sites, i.e. indwelling lines, tubes, and drains  - Monitor endotracheal if appropriate and nasal secretions for changes in amount and color  - [place] appropriate cooling/warming therapies per order  - Administer medications as ordered  - Instruct and encourage patient and family to use good hand hygiene technique  - Identify and instruct in appropriate isolation precautions for identified infection/condition  5/16/2025 0508 by Terry Tejada RN  Outcome: Progressing  5/15/2025 2249 by Terry Tejada RN  Outcome: Progressing  Goal: Absence of fever/infection during neutropenic period  Description: INTERVENTIONS:  - Monitor WBC  - Perform strict hand hygiene  - Limit to healthy visitors only  - No plants, dried, fresh or silk flowers with gabriel in patient room  5/16/2025 0508 by Terry Tejada RN  Outcome:  Progressing  5/15/2025 2249 by Terry Tejada RN  Outcome: Progressing     Problem: SAFETY ADULT  Goal: Patient will remain free of falls  Description: INTERVENTIONS:  - Educate patient/family on patient safety including physical limitations  - Instruct patient to call for assistance with activity   - Consider consulting OT/PT to assist with strengthening/mobility based on AM PAC & JH-HLM score  - Consult OT/PT to assist with strengthening/mobility   - Keep Call bell within reach  - Keep bed low and locked with side rails adjusted as appropriate  - Keep care items and personal belongings within reach  - Initiate and maintain comfort rounds  - Make Fall Risk Sign visible to staff  - Offer Toileting every 2 Hours, in advance of need  -  - Obtain necessary fall risk management equipment: n/a  - Apply yellow socks and bracelet for high fall risk patients  - Consider moving patient to room near nurses station  5/16/2025 0508 by Terry Tejada RN  Outcome: Progressing  5/15/2025 2249 by Terry Tejada RN  Outcome: Progressing  Goal: Maintain or return to baseline ADL function  Description: INTERVENTIONS:  -  Assess patient's ability to carry out ADLs; assess patient's baseline for ADL function and identify physical deficits which impact ability to perform ADLs (bathing, care of mouth/teeth, toileting, grooming, dressing, etc.)  - Assess/evaluate cause of self-care deficits   - Assess range of motion  - Assess patient's mobility; develop plan if impaired  - Assess patient's need for assistive devices and provide as appropriate  - Encourage maximum independence but intervene and supervise when necessary  - Involve family in performance of ADLs  - Assess for home care needs following discharge   - Consider OT consult to assist with ADL evaluation and planning for discharge  - Provide patient education as appropriate  - Monitor functional capacity and physical performance, use of AM PAC & JH-HLM   - Monitor gait, balance and  fatigue with ambulation    5/15/2025 2249 by Terry Tejada RN  Outcome: Progressing  Problem: DISCHARGE PLANNING  Goal: Discharge to home or other facility with appropriate resources  Description: INTERVENTIONS:  - Identify barriers to discharge w/patient and caregiver  - Arrange for needed discharge resources and transportation as appropriate  - Identify discharge learning needs (meds, wound care, etc.)  - Arrange for interpretive services to assist at discharge as needed  - Refer to Case Management Department for coordinating discharge planning if the patient needs post-hospital services based on physician/advanced practitioner order or complex needs related to functional status, cognitive ability, or social support system  5/16/2025 0508 by Terry Tejada RN  Outcome: Progressing  5/15/2025 2249 by Terry Tejada RN  Outcome: Progressing     Problem: Knowledge Deficit  Goal: Patient/family/caregiver demonstrates understanding of disease process, treatment plan, medications, and discharge instructions  Description: Complete learning assessment and assess knowledge base.  Interventions:  - Provide teaching at level of understanding  - Provide teaching via preferred learning methods  5/16/2025 0508 by Terry Tejada RN  Outcome: Progressing  5/15/2025 2249 by Terry Tejada RN  Outcome: Progressing

## 2025-05-16 NOTE — ASSESSMENT & PLAN NOTE
patient recently seen in the emergency room with escalating chest discomfort.  At that time, troponins were negative and EKG was unremarkable  patient seen by his PCP on 5/5/2025 and ordered for exercise nonnuclear stress test  patient experience chest discomfort with ST depression seen on exercise stress test  5/16/2025 TTE: pending  5/16/2025 LHC: proximal LAD with 90% stenosis with mild luminal irregularities seen in the RCA  5/16/2025 PCI: patient underwent implantation of a 3 x 15 mm euphora drug-eluting stent  continue aspirin 81 mg once a day  patient was given Plavix 600 mg PO times one in the Cath Lab  will start Brilinta 90 mg bid this evening  continue Lipitor 40 mg daily  continue Toprol-XL 25 mg at bedtime  Continue lisinopril 10 mg once a day

## 2025-05-17 VITALS
HEART RATE: 50 BPM | TEMPERATURE: 97.5 F | DIASTOLIC BLOOD PRESSURE: 83 MMHG | OXYGEN SATURATION: 97 % | WEIGHT: 220 LBS | RESPIRATION RATE: 18 BRPM | BODY MASS INDEX: 28.23 KG/M2 | SYSTOLIC BLOOD PRESSURE: 136 MMHG | HEIGHT: 74 IN

## 2025-05-17 LAB
ANION GAP SERPL CALCULATED.3IONS-SCNC: 9 MMOL/L (ref 4–13)
BUN SERPL-MCNC: 23 MG/DL (ref 5–25)
CALCIUM SERPL-MCNC: 8.8 MG/DL (ref 8.4–10.2)
CHLORIDE SERPL-SCNC: 106 MMOL/L (ref 96–108)
CO2 SERPL-SCNC: 24 MMOL/L (ref 21–32)
CREAT SERPL-MCNC: 0.81 MG/DL (ref 0.6–1.3)
ERYTHROCYTE [DISTWIDTH] IN BLOOD BY AUTOMATED COUNT: 13.1 % (ref 11.6–15.1)
GFR SERPL CREATININE-BSD FRML MDRD: 93 ML/MIN/1.73SQ M
GLUCOSE SERPL-MCNC: 107 MG/DL (ref 65–140)
GLUCOSE SERPL-MCNC: 118 MG/DL (ref 65–140)
HCT VFR BLD AUTO: 42.8 % (ref 36.5–49.3)
HGB BLD-MCNC: 13.9 G/DL (ref 12–17)
MCH RBC QN AUTO: 30 PG (ref 26.8–34.3)
MCHC RBC AUTO-ENTMCNC: 32.5 G/DL (ref 31.4–37.4)
MCV RBC AUTO: 92 FL (ref 82–98)
PLATELET # BLD AUTO: 157 THOUSANDS/UL (ref 149–390)
PMV BLD AUTO: 11.5 FL (ref 8.9–12.7)
POTASSIUM SERPL-SCNC: 4.5 MMOL/L (ref 3.5–5.3)
RBC # BLD AUTO: 4.63 MILLION/UL (ref 3.88–5.62)
SODIUM SERPL-SCNC: 139 MMOL/L (ref 135–147)
WBC # BLD AUTO: 5.45 THOUSAND/UL (ref 4.31–10.16)

## 2025-05-17 PROCEDURE — 80048 BASIC METABOLIC PNL TOTAL CA: CPT | Performed by: NURSE PRACTITIONER

## 2025-05-17 PROCEDURE — 82948 REAGENT STRIP/BLOOD GLUCOSE: CPT

## 2025-05-17 PROCEDURE — 85027 COMPLETE CBC AUTOMATED: CPT | Performed by: NURSE PRACTITIONER

## 2025-05-17 PROCEDURE — 99232 SBSQ HOSP IP/OBS MODERATE 35: CPT | Performed by: INTERNAL MEDICINE

## 2025-05-17 RX ADMIN — EMPAGLIFLOZIN 10 MG: 10 TABLET, FILM COATED ORAL at 08:32

## 2025-05-17 RX ADMIN — ASPIRIN 81 MG: 81 TABLET, CHEWABLE ORAL at 08:32

## 2025-05-17 RX ADMIN — LISINOPRIL 10 MG: 10 TABLET ORAL at 08:32

## 2025-05-17 RX ADMIN — SUCRALFATE 1 G: 1 TABLET ORAL at 08:32

## 2025-05-17 RX ADMIN — PANTOPRAZOLE SODIUM 40 MG: 40 TABLET, DELAYED RELEASE ORAL at 05:40

## 2025-05-17 RX ADMIN — TICAGRELOR 90 MG: 90 TABLET ORAL at 08:32

## 2025-05-17 NOTE — ASSESSMENT & PLAN NOTE
Lab Results   Component Value Date    EGFR 93 05/17/2025    EGFR 96 05/16/2025    EGFR 95 05/15/2025    CREATININE 0.81 05/17/2025    CREATININE 0.74 05/16/2025    CREATININE 0.77 05/15/2025   Creatinine appears at baseline  Avoid hypotension and nephrotoxic agents  Monitor daily BMP     no history of blood product transfusion

## 2025-05-17 NOTE — ASSESSMENT & PLAN NOTE
Lab Results   Component Value Date    HGBA1C 7.3 (H) 05/15/2025       Recent Labs     05/16/25  1105 05/16/25  1627 05/16/25 2044 05/17/25  0726   POCGLU 127 144* 139 107       Blood Sugar Average: Last 72 hrs:  (P) 132.3892516229895683  managed per the hospitalist team  Continue Jardiance 25 mg daily

## 2025-05-17 NOTE — ASSESSMENT & PLAN NOTE
Lab Results   Component Value Date    EGFR 93 05/17/2025    EGFR 96 05/16/2025    EGFR 95 05/15/2025    CREATININE 0.81 05/17/2025    CREATININE 0.74 05/16/2025    CREATININE 0.77 05/15/2025   appears to be at baseline  continue to monitor

## 2025-05-17 NOTE — ASSESSMENT & PLAN NOTE
BP acceptable  Continue lisinopril 10mg QD and metoprolol succinate 25mg HS  Monitor vitals per routine

## 2025-05-17 NOTE — PLAN OF CARE
Problem: PAIN - ADULT  Goal: Verbalizes/displays adequate comfort level or baseline comfort level  Description: Interventions:  - Encourage patient to monitor pain and request assistance  - Assess pain using appropriate pain scale  - Administer analgesics as ordered based on type and severity of pain and evaluate response  - Implement non-pharmacological measures as appropriate and evaluate response  - Consider cultural and social influences on pain and pain management  - Notify physician/advanced practitioner if interventions unsuccessful or patient reports new pain  - Educate patient/family on pain management process including their role and importance of  reporting pain   - Provide non-pharmacologic/complimentary pain relief interventions  Outcome: Progressing     Problem: INFECTION - ADULT  Goal: Absence or prevention of progression during hospitalization  Description: INTERVENTIONS:  - Assess and monitor for signs and symptoms of infection  - Monitor lab/diagnostic results  - Monitor all insertion sites, i.e. indwelling lines, tubes, and drains  - Monitor endotracheal if appropriate and nasal secretions for changes in amount and color  - [place] appropriate cooling/warming therapies per order  - Administer medications as ordered  - Instruct and encourage patient and family to use good hand hygiene technique  - Identify and instruct in appropriate isolation precautions for identified infection/condition  Outcome: Progressing  Goal: Absence of fever/infection during neutropenic period  Description: INTERVENTIONS:  - Monitor WBC  - Perform strict hand hygiene  - Limit to healthy visitors only  - No plants, dried, fresh or silk flowers with gabriel in patient room  Outcome: Progressing     Problem: SAFETY ADULT  Goal: Patient will remain free of falls  Description: INTERVENTIONS:  - Educate patient/family on patient safety including physical limitations  - Instruct patient to call for assistance with activity   -  Consider consulting OT/PT to assist with strengthening/mobility based on AM PAC & JH-HLM score  - Consult OT/PT to assist with strengthening/mobility   - Keep Call bell within reach  - Keep bed low and locked with side rails adjusted as appropriate  - Keep care items and personal belongings within reach  - Initiate and maintain comfort rounds  - Make Fall Risk Sign visible to staff  - Offer Toileting every 2 Hours, in advance of need  - Initiate/Maintain bed alarm  - Obtain necessary fall risk management equipment:   - Apply yellow socks and bracelet for high fall risk patients  - Consider moving patient to room near nurses station  Outcome: Progressing  Goal: Maintain or return to baseline ADL function  Description: INTERVENTIONS:  -  Assess patient's ability to carry out ADLs; assess patient's baseline for ADL function and identify physical deficits which impact ability to perform ADLs (bathing, care of mouth/teeth, toileting, grooming, dressing, etc.)  - Assess/evaluate cause of self-care deficits   - Assess range of motion  - Assess patient's mobility; develop plan if impaired  - Assess patient's need for assistive devices and provide as appropriate  - Encourage maximum independence but intervene and supervise when necessary  - Involve family in performance of ADLs  - Assess for home care needs following discharge   - Consider OT consult to assist with ADL evaluation and planning for discharge  - Provide patient education as appropriate  - Monitor functional capacity and physical performance, use of AM PAC & JH-HLM   - Monitor gait, balance and fatigue with ambulation    Outcome: Progressing  Goal: Maintains/Returns to pre admission functional level  Description: INTERVENTIONS:  - Perform AM-PAC 6 Click Basic Mobility/ Daily Activity assessment daily.  - Set and communicate daily mobility goal to care team and patient/family/caregiver.   - Collaborate with rehabilitation services on mobility goals if  consulted  - Perform Range of Motion 3 times a day.  - Actively turns self.  - Dangle patient 3 times a day  - Stand patient 3 times a day  - Ambulate patient 3 times a day  - Out of bed to chair 3 times a day   - Out of bed for meals 3 times a day  - Out of bed for toileting  - Record patient progress and toleration of activity level   Outcome: Progressing     Problem: DISCHARGE PLANNING  Goal: Discharge to home or other facility with appropriate resources  Description: INTERVENTIONS:  - Identify barriers to discharge w/patient and caregiver  - Arrange for needed discharge resources and transportation as appropriate  - Identify discharge learning needs (meds, wound care, etc.)  - Arrange for interpretive services to assist at discharge as needed  - Refer to Case Management Department for coordinating discharge planning if the patient needs post-hospital services based on physician/advanced practitioner order or complex needs related to functional status, cognitive ability, or social support system  Outcome: Progressing     Problem: Knowledge Deficit  Goal: Patient/family/caregiver demonstrates understanding of disease process, treatment plan, medications, and discharge instructions  Description: Complete learning assessment and assess knowledge base.  Interventions:  - Provide teaching at level of understanding  - Provide teaching via preferred learning methods  Outcome: Progressing     Problem: NEUROSENSORY - ADULT  Goal: Achieves maximal functionality and self care  Description: INTERVENTIONS  - Monitor swallowing and airway patency with patient fatigue and changes in neurological status  - Encourage and assist patient to increase activity and self care.   - Encourage visually impaired, hearing impaired and aphasic patients to use assistive/communication devices  Outcome: Progressing     Problem: CARDIOVASCULAR - ADULT  Goal: Maintains optimal cardiac output and hemodynamic stability  Description:  INTERVENTIONS:  - Monitor I/O, vital signs and rhythm  - Monitor for S/S and trends of decreased cardiac output  - Administer and titrate ordered vasoactive medications to optimize hemodynamic stability  - Assess quality of pulses, skin color and temperature  - Assess for signs of decreased coronary artery perfusion  - Instruct patient to report change in severity of symptoms  Outcome: Progressing  Goal: Absence of cardiac dysrhythmias or at baseline rhythm  Description: INTERVENTIONS:  - Continuous cardiac monitoring, vital signs, obtain 12 lead EKG if ordered  - Administer antiarrhythmic and heart rate control medications as ordered  - Monitor electrolytes and administer replacement therapy as ordered  Outcome: Progressing

## 2025-05-17 NOTE — DISCHARGE SUMMARY
Discharge Summary - Cardiology   Name: Jayy Wells 65 y.o. male I MRN: [id number]  Unit/Bed#: 4 [facility] 401-01 I Date of Admission: 5/15/2025   Date of Service: 5/17/2025 I Hospital Day: 2    Admission Date: 5/15/2025 1502  Discharge Date: 05/17/25  Admitting Diagnosis: Angina at rest ([facility])  Discharge Diagnosis:   Medical Problems       Resolved Problems  Date Reviewed: 5/9/2025   None         HPI: Recurrent chest pain with activity    Procedures Performed:   Orders Placed This Encounter   Procedures    Cardiac catheterization       Summary of Hospital Course:   Assessment & Plan  Precordial pain  patient recently seen in the emergency room with escalating chest discomfort.  At that time, troponins were negative and EKG was unremarkable  patient seen by his PCP on 5/5/2025 and ordered for exercise nonnuclear stress test  patient experience chest discomfort with ST depression seen on exercise stress test  5/16/2025 TTE: pending  5/16/2025 LHC: proximal LAD with 90% stenosis with mild luminal irregularities seen in the RCA  5/16/2025 PCI: patient underwent implantation of a 3 x 15 mm euphora drug-eluting stent  continue aspirin 81 mg once a day  patient was given Plavix 600 mg PO times one in the Cath Lab  will start Brilinta 90 mg bid this evening  continue Lipitor 40 mg daily  continue Toprol-XL 25 mg at bedtime  Continue lisinopril 10 mg once a day    Type 2 diabetes mellitus with stage 1 chronic kidney disease, without long-term current use of insulin  ([facility])  Lab Results   Component Value Date    HGBA1C 7.3 (H) 05/15/2025       Recent Labs     05/16/25  1105 05/16/25  1627 05/16/25  2044 05/17/25  0726   POCGLU 127 144* 139 107       Blood Sugar Average: Last 72 hrs:  (P) [id number]  managed per the hospitalist team  Continue Jardiance 25 mg daily    Primary hypertension  blood pressures currently stable  continue lisinopril 10 mg daily    CKD (chronic kidney disease) stage 1, GFR 90 ml/min or  greater  Lab Results   Component Value Date    EGFR 93 05/17/2025    EGFR 96 05/16/2025    EGFR 95 05/15/2025    CREATININE 0.81 05/17/2025    CREATININE 0.74 05/16/2025    CREATININE 0.77 05/15/2025   appears to be at baseline  continue to monitor  Other hyperlipidemia  5/16/2025 total cholesterol 131, triglycerides 95, HDL 42 and LDL 70  continue Lipitor 40 mg daily       Significant Findings, Care, Treatment and Services Provided: left heart catheterization or PCI of proximal LAD    Complications: ecchymosis at right radial site, neurovascular status stable    Condition at Discharge: stable       Discharge instructions/Information to patient and family:   See After Visit Summary (AVS) for information provided to patient and family.      Provisions for Follow-Up Care:  See after visit summary for information related to follow-up care and any pertinent home health orders.      PCP: Giovanni Hankins DO    Disposition: See After Visit Summary for discharge disposition information.    Planned Readmission: No     Discharge Medications:  See after visit summary for reconciled discharge medications provided to patient and family.      Cherrie PINEDA  Cardiology

## 2025-05-17 NOTE — ASSESSMENT & PLAN NOTE
Patient with direct admission secondary to symptomatic stress today as outpatient with ST depressions noted on ECG  S/p therapeutic Lovenox dosing  Echo: The left ventricular ejection fraction is 55% by visual estimation. Systolic function is normal. Diastolic function is mildly abnormal, consistent with grade I (abnormal) relaxation.   [facility] (5/16/25): 1 vessel coronary artery disease. Proximal LAD lesion is 90% stenosed and reduced to 0% with CAROLINE x1. LVEDP is  18 mmHg  Continue ASA 81mg daily, Brilinta 90mg BID, Metoprolol succinate 25mg HS, Lipitor 40mg daily  Monitor on telemetry  Currently under Cardiology service, remainder of care per their team

## 2025-05-17 NOTE — ASSESSMENT & PLAN NOTE
Lab Results   Component Value Date    HGBA1C 7.3 (H) 05/15/2025       Recent Labs     05/16/25  1105 05/16/25  1627 05/16/25 2044 05/17/25  0726   POCGLU 127 144* 139 107       Repeat HA1C 7.3  Continue PTA Jardiance  SSI with accu checks  Diabetic diet  Follow up with PCP

## 2025-05-17 NOTE — ASSESSMENT & PLAN NOTE
5/16/2025 total cholesterol 131, triglycerides 95, HDL 42 and LDL 70  continue Lipitor 40 mg daily

## 2025-05-17 NOTE — PROGRESS NOTES
Progress Note - Hospitalist   Name: Jayy Wells 65 y.o. male I MRN: [id number]  Unit/Bed#: [address] [place] [address] Date of Admission: 5/15/2025   Date of Service: 5/17/2025 I Hospital Day: 2    Assessment & Plan  Precordial pain  Patient with direct admission secondary to symptomatic stress today as outpatient with ST depressions noted on ECG  S/p therapeutic Lovenox dosing  Echo: The left ventricular ejection fraction is 55% by visual estimation. Systolic function is normal. Diastolic function is mildly abnormal, consistent with grade I (abnormal) relaxation.   [facility] (5/16/25): 1 vessel coronary artery disease. Proximal LAD lesion is 90% stenosed and reduced to 0% with CAROLINE x1. LVEDP is  18 mmHg  Continue ASA 81mg daily, Brilinta 90mg BID, Metoprolol succinate 25mg HS, Lipitor 40mg daily  Monitor on telemetry  Currently under Cardiology service, remainder of care per their team  Type 2 diabetes mellitus with stage 1 chronic kidney disease, without long-term current use of insulin  (HCC)  Lab Results   Component Value Date    HGBA1C 7.3 (H) 05/15/2025       Recent Labs     05/16/25  1105 05/16/25  1627 05/16/25  2044 05/17/25  0726   POCGLU 127 144* 139 107       Repeat HA1C 7.3  Continue PTA Jardiance  SSI with accu checks  Diabetic diet  Follow up with PCP    Primary hypertension  BP acceptable  Continue lisinopril 10mg QD and metoprolol succinate 25mg HS  Monitor vitals per routine  CKD (chronic kidney disease) stage 1, GFR 90 ml/min or greater  Lab Results   Component Value Date    EGFR 93 05/17/2025    EGFR 96 05/16/2025    EGFR 95 05/15/2025    CREATININE 0.81 05/17/2025    CREATININE 0.74 05/16/2025    CREATININE 0.77 05/15/2025   Creatinine appears at baseline  Avoid hypotension and nephrotoxic agents  Monitor daily BMP    Other hyperlipidemia  Continue statin    VTE Pharmacologic Prophylaxis:   Low Risk (Score 0-2) - Encourage Ambulation.    Mobility:   Basic Mobility Inpatient Raw Score: 24  [facility] Goal: 8: Walk  250 feet or more  JH-HLM Achieved: 7: Walk 25 feet or more  JH-HLM Goal achieved. Continue to encourage appropriate mobility.    Patient Centered Rounds: I performed bedside rounds with nursing staff today.   Discussions with Specialists or Other Care Team Provider: Nursing, Cardiology    Education and Discussions with Family / Patient: Per primary team.     Current Length of Stay: 2 day(s)  Current Patient Status: Inpatient   Certification Statement: The patient will continue to require additional inpatient hospital stay due to precordial pain  Discharge Plan: Anticipate discharge later today or tomorrow to home.    Code Status: Level 1 - Full Code    Subjective   Patient sitting upright in bed, denies any acute complaints. Appetite is good.     Objective :  Temp:  [97.4 °F (36.3 °C)-98 °F (36.7 °C)] 97.5 °F (36.4 °C)  HR:  [41-64] 50  BP: (111-146)/(53-85) 136/83  Resp:  [12-19] 18  SpO2:  [95 %-98 %] 97 %  O2 Device: None (Room air)  Nasal Cannula O2 Flow Rate (L/min):  [2 L/min] 2 L/min    Body mass index is 28.25 kg/m².     Input and Output Summary (last 24 hours):     Intake/Output Summary (Last 24 hours) at 5/17/2025 0823  Last data filed at 5/17/2025 0301  Gross per 24 hour   Intake 1010 ml   Output --   Net 1010 ml       Physical Exam  Vitals and nursing note reviewed.   Constitutional:       General: He is not in acute distress.     Appearance: He is well-developed.   HENT:      Head: Normocephalic and atraumatic.     Eyes:      Conjunctiva/sclera: Conjunctivae normal.       Cardiovascular:      Rate and Rhythm: Normal rate and regular rhythm.      Heart sounds: No murmur heard.  Pulmonary:      Effort: Pulmonary effort is normal. No respiratory distress.      Breath sounds: Normal breath sounds.   Abdominal:      Palpations: Abdomen is soft.      Tenderness: There is no abdominal tenderness.     Musculoskeletal:         General: No swelling.      Cervical back: Neck supple.     Skin:     General: Skin is  warm and dry.     Neurological:      Mental Status: He is alert.     Psychiatric:         Mood and Affect: Mood normal.           Lines/Drains:        Telemetry:  Telemetry Orders (From admission, onward)               24 Hour Telemetry Monitoring  Continuous x 24 Hours (Telem)        Expiring   Question:  Reason for 24 Hour Telemetry  Answer:  Arrhythmias requiring acute medical intervention / PPM or ICD malfunction                     Telemetry Reviewed: Normal Sinus Rhythm  Indication for Continued Telemetry Use: Acute MI/Unstable Angina/Rule out ACS               Lab Results: I have reviewed the following results:   Results from last 7 days   Lab Units 05/17/25  0541 05/16/25  0445   WBC Thousand/uL 5.45 5.45   HEMOGLOBIN g/dL 13.9 13.2   HEMATOCRIT % 42.8 40.2   PLATELETS Thousands/uL 157 151   SEGS PCT %  --  57   LYMPHO PCT %  --  32   MONO PCT %  --  8   EOS PCT %  --  3     Results from last 7 days   Lab Units 05/17/25  0541 05/16/25  0445   SODIUM mmol/L 139 138   POTASSIUM mmol/L 4.5 3.7   CHLORIDE mmol/L 106 106   CO2 mmol/L 24 22   BUN mg/dL 23 26*   CREATININE mg/dL 0.81 0.74   ANION GAP mmol/L 9 10   CALCIUM mg/dL 8.8 8.7   ALBUMIN g/dL  --  3.8   TOTAL BILIRUBIN mg/dL  --  0.45   ALK PHOS U/L  --  38   ALT U/L  --  17   AST U/L  --  13   GLUCOSE RANDOM mg/dL 118 117         Results from last 7 days   Lab Units 05/17/25  0726 05/16/25  2044 05/16/25  1627 05/16/25  1105 05/16/25  0718 05/15/25  2011   POC GLUCOSE mg/dl 107 139 144* 127 122 157*     Results from last 7 days   Lab Units 05/15/25  1547   HEMOGLOBIN A1C % 7.3*           Recent Cultures (last 7 days):         Imaging Results Review: I reviewed radiology reports from this admission including: procedure reports and Echocardiogram.  Other Study Results Review: No additional pertinent studies reviewed.    Last 24 Hours Medication List:     Current Facility-Administered Medications:     acetaminophen (TYLENOL) tablet 650 mg, Q6H PRN    aspirin  chewable tablet 81 mg, Daily    atorvastatin (LIPITOR) tablet 40 mg, Daily With Dinner    Empagliflozin (JARDIANCE) tablet 10 mg, Daily    insulin lispro (HumALOG/ADMELOG) 100 units/mL subcutaneous injection 1-5 Units, TID AC **AND** Fingerstick Glucose (POCT), TID AC    insulin lispro (HumALOG/ADMELOG) 100 units/mL subcutaneous injection 1-5 Units, HS    lisinopril (ZESTRIL) tablet 10 mg, Daily    metoprolol succinate (TOPROL-XL) 24 hr tablet 25 mg, HS    pantoprazole (PROTONIX) EC tablet 40 mg, Early Morning    sucralfate (CARAFATE) tablet 1 g, 4x Daily    ticagrelor (BRILINTA) tablet 90 mg, Q12H CHRISTINE    Administrative Statements   Today, Patient Was Seen By: Belinda Cooper PA-C  I have spent a total time of 30 minutes in caring for this patient on the day of the visit/encounter including Diagnostic results, Risks and benefits of tx options, Instructions for management, Patient and family education, Importance of tx compliance, Counseling / Coordination of care, Documenting in the medical record, Reviewing/placing orders in the medical record (including tests, medications, and/or procedures), Obtaining or reviewing history  , and Communicating with other healthcare professionals .    **Please Note: This note may have been constructed using a voice recognition system.**

## 2025-05-17 NOTE — ASSESSMENT & PLAN NOTE
Lab Results   Component Value Date    HGBA1C 7.3 (H) 05/15/2025       Recent Labs     05/16/25  1105 05/16/25  1627 05/16/25 2044 05/17/25  0726   POCGLU 127 144* 139 107       Blood Sugar Average: Last 72 hrs:  (P) 132.9090060031527233  managed per the hospitalist team  Continue Jardiance 25 mg daily

## 2025-05-17 NOTE — PROGRESS NOTES
Progress Note - Cardiology   Name: Jayy Wells 65 y.o. male I MRN: [id number]  Unit/Bed#: [address] [address] I Date of Admission: 5/15/2025   Date of Service: 5/17/2025 I Hospital Day: 2    Assessment & Plan  Precordial pain  patient recently seen in the emergency room with escalating chest discomfort.  At that time, troponins were negative and EKG was unremarkable  patient seen by his PCP on 5/5/2025 and ordered for exercise nonnuclear stress test  patient experience chest discomfort with ST depression seen on exercise stress test  5/16/2025 TTE: pending  5/16/2025 LHC: proximal LAD with 90% stenosis with mild luminal irregularities seen in the RCA  5/16/2025 PCI: patient underwent implantation of a 3 x 15 mm euphora drug-eluting stent  continue aspirin 81 mg once a day  patient was given Plavix 600 mg PO times one in the Cath Lab  will start Brilinta 90 mg bid this evening  continue Lipitor 40 mg daily  continue Toprol-XL 25 mg at bedtime  Continue lisinopril 10 mg once a day    Type 2 diabetes mellitus with stage 1 chronic kidney disease, without long-term current use of insulin  ([facility])  Lab Results   Component Value Date    HGBA1C 7.3 (H) 05/15/2025       Recent Labs     05/16/25  1105 05/16/25  1627 05/16/25  2044 05/17/25  0726   POCGLU 127 144* 139 107       Blood Sugar Average: Last 72 hrs:  (P) 132.4089872213468932  managed per the hospitalist team  Continue Jardiance 25 mg daily    Primary hypertension  blood pressures currently stable  continue lisinopril 10 mg daily    CKD (chronic kidney disease) stage 1, GFR 90 ml/min or greater  Lab Results   Component Value Date    EGFR 93 05/17/2025    EGFR 96 05/16/2025    EGFR 95 05/15/2025    CREATININE 0.81 05/17/2025    CREATININE 0.74 05/16/2025    CREATININE 0.77 05/15/2025   appears to be at baseline  continue to monitor  Other hyperlipidemia  5/16/2025 total cholesterol 131, triglycerides 95, HDL 42 and LDL 70  continue Lipitor 40 mg daily    Subjective    Chief Complaint: Patient seen and examined. No complaints of chest discomfort. Right radial site intact, small area of ecchymosis. Patient instructed to apply ice and heat to area and keep arm elevated.    Objective :  Temp:  [97.4 °F (36.3 °C)-98 °F (36.7 °C)] 97.5 °F (36.4 °C)  HR:  [41-64] 50  BP: (111-146)/(53-85) 136/83  Resp:  [12-19] 18  SpO2:  [95 %-98 %] 97 %  O2 Device: None (Room air)  Nasal Cannula O2 Flow Rate (L/min):  [2 L/min] 2 L/min  Orthostatic Blood Pressures      Flowsheet Row Most Recent Value   Blood Pressure 136/83 filed at 05/17/2025 0742   Patient Position - Orthostatic VS Lying filed at 05/17/2025 0249          First Weight: Weight - Scale: 99.8 kg (220 lb) (05/15/25 1516)  Vitals:    05/15/25 1516   Weight: 99.8 kg (220 lb)     Physical Exam  Vitals and nursing note reviewed.   Constitutional:       General: He is not in acute distress.     Appearance: Normal appearance. He is normal weight.   HENT:      Right Ear: External ear normal.      Left Ear: External ear normal.     Eyes:      General:         Right eye: No discharge.         Left eye: No discharge.       Cardiovascular:      Rate and Rhythm: Normal rate and regular rhythm.      Pulses: Normal pulses.      Comments: Right radial site clean dry and intact, neurovascular status intact, ecchymosis noted in forearm, patient instructed to keep arm elevated when possible and apply ice and heat. Monitor for worsening.  Pulmonary:      Effort: Pulmonary effort is normal. No respiratory distress.      Breath sounds: Normal breath sounds.   Abdominal:      General: There is no distension.      Palpations: Abdomen is soft.     Musculoskeletal:      Right lower leg: No edema.      Left lower leg: No edema.     Skin:     General: Skin is warm and dry.      Capillary Refill: Capillary refill takes less than 2 seconds.     Neurological:      Mental Status: He is alert and oriented to person, place, and time. Mental status is at baseline.  "    Psychiatric:         Mood and Affect: Mood normal.           Lab Results: I have reviewed the following results:  Results from last 7 days   Lab Units 05/17/25  0541 05/16/25  0445 05/15/25  1547   WBC Thousand/uL 5.45 5.45  --    HEMOGLOBIN g/dL 13.9 13.2  --    HEMATOCRIT % 42.8 40.2  --    PLATELETS Thousands/uL 157 151 176     Results from last 7 days   Lab Units 05/17/25  0541 05/16/25  0445 05/15/25  1547   POTASSIUM mmol/L 4.5 3.7 3.9   CHLORIDE mmol/L 106 106 104   CO2 mmol/L 24 22 23   BUN mg/dL 23 26* 24   CREATININE mg/dL 0.81 0.74 0.77   CALCIUM mg/dL 8.8 8.7 9.2         Lab Results   Component Value Date    HGBA1C 7.3 (H) 05/15/2025     No results found for: \"CKTOTAL\", \"CKMB\", \"CKMBINDEX\", \"TROPONINI\"    Telemetry demonstrates sinus rhythm    VTE Pharmacologic Prophylaxis: VTE covered by:    None     VTE Mechanical Prophylaxis: sequential compression device    Cherrie PINEDA  Cardiology  "

## 2025-05-19 ENCOUNTER — TRANSITIONAL CARE MANAGEMENT (OUTPATIENT)
Dept: FAMILY MEDICINE CLINIC | Facility: CLINIC | Age: 65
End: 2025-05-19

## 2025-05-21 ENCOUNTER — OFFICE VISIT (OUTPATIENT)
Dept: CARDIOLOGY CLINIC | Facility: CLINIC | Age: 65
End: 2025-05-21
Payer: MEDICARE

## 2025-05-21 VITALS
BODY MASS INDEX: 29 KG/M2 | HEIGHT: 74 IN | WEIGHT: 226 LBS | DIASTOLIC BLOOD PRESSURE: 68 MMHG | SYSTOLIC BLOOD PRESSURE: 110 MMHG | HEART RATE: 50 BPM | OXYGEN SATURATION: 99 %

## 2025-05-21 DIAGNOSIS — E78.49 OTHER HYPERLIPIDEMIA: ICD-10-CM

## 2025-05-21 DIAGNOSIS — I10 PRIMARY HYPERTENSION: ICD-10-CM

## 2025-05-21 DIAGNOSIS — I25.118 CORONARY ARTERY DISEASE INVOLVING NATIVE CORONARY ARTERY OF NATIVE HEART WITH OTHER FORM OF ANGINA PECTORIS (HCC): Primary | ICD-10-CM

## 2025-05-21 DIAGNOSIS — N18.1 TYPE 2 DIABETES MELLITUS WITH STAGE 1 CHRONIC KIDNEY DISEASE, WITHOUT LONG-TERM CURRENT USE OF INSULIN  (HCC): ICD-10-CM

## 2025-05-21 DIAGNOSIS — E11.22 TYPE 2 DIABETES MELLITUS WITH STAGE 1 CHRONIC KIDNEY DISEASE, WITHOUT LONG-TERM CURRENT USE OF INSULIN  (HCC): ICD-10-CM

## 2025-05-21 PROBLEM — Z95.5 H/O HEART ARTERY STENT: Status: ACTIVE | Noted: 2025-05-21

## 2025-05-21 PROBLEM — I25.10 CORONARY ARTERY DISEASE INVOLVING NATIVE CORONARY ARTERY OF NATIVE HEART WITHOUT ANGINA PECTORIS: Status: ACTIVE | Noted: 2025-05-21

## 2025-05-21 PROCEDURE — 99214 OFFICE O/P EST MOD 30 MIN: CPT | Performed by: INTERNAL MEDICINE

## 2025-05-21 RX ORDER — METOPROLOL SUCCINATE 25 MG/1
25 TABLET, EXTENDED RELEASE ORAL
Qty: 90 TABLET | Refills: 3 | Status: SHIPPED | OUTPATIENT
Start: 2025-05-21

## 2025-05-21 RX ORDER — ATORVASTATIN CALCIUM 40 MG/1
40 TABLET, FILM COATED ORAL
Qty: 90 TABLET | Refills: 1 | Status: SHIPPED | OUTPATIENT
Start: 2025-05-21

## 2025-05-21 RX ORDER — LISINOPRIL 10 MG/1
10 TABLET ORAL DAILY
Qty: 100 TABLET | Refills: 2 | Status: SHIPPED | OUTPATIENT
Start: 2025-05-21

## 2025-05-21 NOTE — ASSESSMENT & PLAN NOTE
Lab Results   Component Value Date    HGBA1C 7.3 (H) 05/15/2025   Importance of blood sugar control discussed with him.  Jardiance was increased to 25 mg daily.

## 2025-05-21 NOTE — PROGRESS NOTES
Cardiology   Dilan Sanchez DO, [facility]  Josef Son MD, [facility]  Hernan Leigh MD, [facility]  Juan Fisher MD, [facility]  -------------------------------------------------------------------  [facility]  [address], [address], [address]  [place], [place], [address]  [phone]    Cardiology Follow Up  Jayy Wells  1960  [id number]          Assessment/Plan:    Assessment & Plan  Coronary artery disease involving native coronary artery of native heart with other form of angina pectoris (HCC)  Patient with recently placed LAD stent.  He has had resolution of his angina.  Reviewed current medication regimen with him.  Continue aspirin and Brilinta  Continue atorvastatin  Type 2 diabetes mellitus with stage 1 chronic kidney disease, without long-term current use of insulin  ([facility])    Lab Results   Component Value Date    HGBA1C 7.3 (H) 05/15/2025   Importance of blood sugar control discussed with him.  Jardiance was increased to 25 mg daily.  Primary hypertension  Blood pressure controlled with lisinopril and metoprolol  Other hyperlipidemia  Continue atorvastatin 40 mg daily.  Was on 10 mg daily previously.  Goal LDL cholesterol less than 40 mg/dL.            Interval History:     Jayy Wells is 65 y.o. male here for followup of coronary artery disease.  The patient was admitted to [facility] on May 15, 2025 with chest pain.  He initially presented for treadmill stress test which was ordered for evaluation of chest pain which she described as a tightness/discomfort in his upper chest.  Stress test showed significant ST segment depressions and he underwent cardiac catheterization the following day which showed a 90% proximal LAD stenosis.  He was treated with Tilden frontier 3.5 mm x 22 mm drug-eluting stent.   Since hospital discharge, he has been feeling well without further chest pain.  Denies any shortness of breath.  He has cardiac risk factors of  "obesity, diabetes, hypertension and dyslipidemia.  He reports good adherence with medications.  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    The following portions of the patient's history were reviewed and updated as appropriate: allergies, current medications, past family history, past medical history, past social history, past surgical history, and problem list.     Current Medications[1]        Review of Systems:  Review of Systems   Respiratory:  Negative for shortness of breath.    Cardiovascular:  Negative for chest pain, palpitations and leg swelling.   All other systems reviewed and are negative.        Physical Exam:  Vitals:  Vitals:    05/21/25 0837   BP: 110/68   BP Location: Left arm   Patient Position: Sitting   Cuff Size: Large   Pulse: (!) 50   SpO2: 99%   Weight: 103 kg (226 lb)   Height: 6' 2\" (1.88 m)     Physical Exam   Constitutional: He appears healthy. No distress.   Eyes: Pupils are equal, round, and reactive to light. Conjunctivae are normal.   Neck: No JVD present.   Cardiovascular: Normal rate, regular rhythm and normal heart sounds. Exam reveals no gallop and no friction rub.   No murmur heard.  Pulmonary/Chest: Effort normal and breath sounds normal. He has no wheezes. He has no rales.   Musculoskeletal:         General: No tenderness, deformity or edema.      Cervical back: Normal range of motion and neck supple.   Neurological: He is alert and oriented to person, place, and time.   Skin: Skin is warm and dry.        Cardiographics:  EKG: Personally reviewed   Last known EF: 55%    This note was completed in part utilizing M-Modal Fluency Direct Software.  Grammatical errors, random word insertions, spelling mistakes, and incomplete sentences can be an occasional consequence of this system secondary to software limitations, ambient noise, and hardware issues.  If you have any questions or concerns about the content, text, or information contained within the " body of this dictation, please contact the provider for clarification.           [1]   Current Outpatient Medications:     aspirin 81 mg chewable tablet, Chew 1 tablet (81 mg total) daily, Disp: , Rfl:     atorvastatin (LIPITOR) 40 mg tablet, Take 1 tablet (40 mg total) by mouth daily with dinner, Disp: 90 tablet, Rfl: 1    Blood Glucose Monitoring Suppl (Contour Next One) KIT, Use one test strip once daily [E11.29], Disp: 1 kit, Rfl: 1    Empagliflozin 25 MG TABS, Take 1 tablet (25 mg total) by mouth every morning, Disp: , Rfl:     glucose blood (Contour Next Test) test strip, Use one test strip once daily [E11.29], Disp: 100 strip, Rfl: 3    Lancets (onetouch ultrasoft) lancets, Use one test strip once daily [E11.29], Disp: 100 each, Rfl: 3    lisinopril (ZESTRIL) 10 mg tablet, Take 1 tablet (10 mg total) by mouth daily, Disp: 100 tablet, Rfl: 1    metoprolol succinate (TOPROL-XL) 25 mg 24 hr tablet, Take 1 tablet (25 mg total) by mouth daily at bedtime, Disp: 90 tablet, Rfl: 1    pantoprazole (PROTONIX) 40 mg tablet, Take 1 tablet (40 mg total) by mouth daily in the early morning Do not start before May 17, 2025., Disp: 90 tablet, Rfl: 1    ticagrelor (BRILINTA) 90 MG, Take 1 tablet (90 mg total) by mouth every 12 (twelve) hours, Disp: 180 tablet, Rfl: 3

## 2025-05-21 NOTE — ASSESSMENT & PLAN NOTE
Continue atorvastatin 40 mg daily.  Was on 10 mg daily previously.  Goal LDL cholesterol less than 40 mg/dL.

## 2025-05-23 LAB
CHEST PAIN STATEMENT: NORMAL
MAX DIASTOLIC BP: 80 MMHG
MAX PREDICTED HEART RATE: 155 BPM
PROTOCOL NAME: NORMAL
REASON FOR TERMINATION: NORMAL
STRESS POST EXERCISE DUR MIN: 5 MIN
STRESS POST EXERCISE DUR SEC: 0 SEC
STRESS POST PEAK HR: 142 BPM
STRESS POST PEAK SYSTOLIC BP: 180 MMHG
TARGET HR FORMULA: NORMAL

## 2025-06-16 ENCOUNTER — TELEPHONE (OUTPATIENT)
Dept: CARDIAC REHAB | Facility: CLINIC | Age: 65
End: 2025-06-16

## 2025-06-17 ENCOUNTER — CLINICAL SUPPORT (OUTPATIENT)
Dept: CARDIAC REHAB | Facility: CLINIC | Age: 65
End: 2025-06-17
Attending: NURSE PRACTITIONER
Payer: MEDICARE

## 2025-06-17 DIAGNOSIS — I25.118 CORONARY ARTERY DISEASE INVOLVING NATIVE CORONARY ARTERY OF NATIVE HEART WITH OTHER FORM OF ANGINA PECTORIS (HCC): ICD-10-CM

## 2025-06-17 DIAGNOSIS — I20.89 ANGINA AT REST (HCC): ICD-10-CM

## 2025-06-17 DIAGNOSIS — Z95.5 S/P CORONARY ARTERY STENT PLACEMENT: Primary | ICD-10-CM

## 2025-06-17 DIAGNOSIS — I10 PRIMARY HYPERTENSION: ICD-10-CM

## 2025-06-17 DIAGNOSIS — E11.22 TYPE 2 DIABETES MELLITUS WITH STAGE 1 CHRONIC KIDNEY DISEASE, WITHOUT LONG-TERM CURRENT USE OF INSULIN  (HCC): ICD-10-CM

## 2025-06-17 DIAGNOSIS — N18.1 TYPE 2 DIABETES MELLITUS WITH STAGE 1 CHRONIC KIDNEY DISEASE, WITHOUT LONG-TERM CURRENT USE OF INSULIN  (HCC): ICD-10-CM

## 2025-06-17 DIAGNOSIS — Z98.61 HISTORY OF PERCUTANEOUS CORONARY INTERVENTION: ICD-10-CM

## 2025-06-17 PROCEDURE — 93797 PHYS/QHP OP CAR RHAB WO ECG: CPT

## 2025-06-17 NOTE — PROGRESS NOTES
CARDIAC REHABILITATION   ASSESSMENT AND INDIVIDUALIZED TREATMENT PLAN  INITIAL           Today's date: 2025   # of Exercise Sessions Completed: 1  Patient name: Jayy Wells      : 1960  Age: 65 y.o.       MRN: [id number]  Referring Physician: Josef Son MD  Cardiologist: Dilan Sanchez DO  Provider: Beni  Clinician: Anastacia Bulter RN         Treatment is tailored to this patient's individual needs.  The ITP was reviewed with the patient and all questions were answered to their satisfaction.  Additional ITP documentation can be found electronically including daily and monthly exercise summaries, daily session notes with ECG summaries, education notes, daily medication reconciliation, and daily physician supervision.      INITIAL EVALUATION SUMMARY DATE:  2025      Patient's subjective report of progress/symptoms since event:   He stated he is doing well, he has been following a heart healthy diabetic diety. He has returned to work full time as a . He plans to participate in the cardiac rehabilitation program 2 times a week depending on work schedule. He denies any chest pain or shortness of breath. 5  Current functional status:  home exercise/ADLs/recreational hobbies:  No home exercise/able to complete ADL's/enjoys traveling and hunting  Work Status:   full time job   Clinical Comments:   Completed cardiac rehabilitation evaluation. Explained Cardiac rehabilitation, how the heart functions, heart healthy diet, Diabetes and exercise. Completed 9 minutes of ETT. He c/o chronic discomfort of right knee.     Initial Fitness Assessment:   Submaximal TM ETT:  Resting:  BP: 124/74  HR: 58  Exercise:  BP: 158/72  HR: 87  METs:  4.3  ECG Summary: bradycardia at rest, NSR with exercise  Symptoms: chronic right knee discomfort  Test terminated at:  RPE 6      Dx:   Encounter Diagnoses   Name Primary?    Type 2 diabetes mellitus with stage 1 chronic kidney disease, without long-term  current use of insulin  (HCC)     Angina at rest (HCC)     Coronary artery disease involving native coronary artery of native heart with other form of angina pectoris (HCC)     Primary hypertension     History of percutaneous coronary intervention     S/P coronary artery stent placement Yes       Description of Diagnosis: admitted to [facility] on May 15, 2025 with chest pain.  He initially presented for treadmill stress test which was ordered for evaluation of chest pain which she described as a tightness/discomfort in his upper chest.  Stress test showed significant ST segment depressions and he underwent cardiac catheterization the following day which showed a 90% proximal LAD stenosis.  He was treated with [place] frontier 3.5 mm x 22 mm drug-eluting stent.   Date of onset: 5/15/2025      ASSESSMENT    Medical History:   Past Medical History[1]    Family History:  Family History[2]    Allergies:   Patient has no known allergies.    Current Medications: yes  Current Medications[3]    Medication compliance:  Pt reports to be compliant with medications    Physical Limitations: chronic discomfort of bilateral shoulders, knees and back    Fall Risk: Low   Comments: Ambulates with a steady gait with no assist device and Denies a fall in the past 6 months      CARDIAC RISK FACTOR MODIFICATION:  Cardiac risk factor modification, including education, counseling, and   behavioral intervention will be provided tailored to the patients' needs.    Cholesterol: Yes  HTN: Yes  DM: Type 2   average   Obesity: No   Inactivity: Yes      EXERCISE ASSESSMENT:      SMART Exercise Goals:   increased peak METs tolerated in rehab exercise session  attend rehab regularly  maintain > 150 minutes per week of moderate intensity exercise    Patient Specific EXERCISE GOALS:       Attend cardiac rehabilitation regularly  Walk at home      Functional Capacity Screening Tool:  [facility] Activity Status Index:  8.91  "METs    NUTRITION ASSESSMENT:    Initial Weight:  223  Current Weight: 223    Height:   Ht Readings from Last 1 Encounters:   05/21/25 6' 2\" (1.88 m)       Rate Your Plate Score: 64/81    Diabetes: T2D, Patient monitors BS 1 times/day, Patient reported fasting , no insulin  A1c: 7.3    last measured: 5/15/2025    Lipid management: Discussed diet and lipid management and Last lipid profile 5/16/2025  Chol 131  TRG 95  HDL 42  LDL 70    Current Dietary Habits:  States he has lowered his Ha1c, has decreased his portion sizes, follows a heart healthy DM diet.     SMART Nutrition Goals:   Continue to follow dietary changes, lower in sodium, fat and carbohydrates    Patient Specific NUTRITION GOALS:     1. Continue to consume a healthy diet   2. Continue to reduce food portions      Drug/Alcohol Use:   Yes, occasional 1 drink a month      PSYCHOSOCIAL ASSESSMENT:    Date of last Assessment:  6/17/2025  Depression screening:  PHQ-9 = 0    Interpretation:  0 =No Depression  Anxiety screening:  EMILIE-7 = 0    Interpretation: 0-4  = Not anxious    Pt self-report of depression and anxiety   Patient reports they are coping well with good social support and denies depression or anxiety  Reports sufficient emotional support   Provided contact information for [facility]    Self-reported stress level:  0   Stressors:  denies any stress  Stress Management Tools: keep a positive mindset    SMART Psychosocial Goals:     Continue with positive attitude    Patient Specific PSYCHOCOSOCIAL GOALS:    Continue with positive attitude      Quality of Life Screen:  (Higher score indicates disease impact on QOL)  [facility] COOP score: 11/45     Social Support:   spouse  Community/Social Activities: friends     Psychosocial Assessment as it relates to rehabilitation:   Patient denies issues with his/her family or home life that may affect their rehabilitation efforts.       OTHER CORE COMPONENT ASSESSMENT:    Tobacco Use:   "   N/A:  Patient is a non-smoker     Anginal Symptoms:  None   NTG use: No prescription    SMART Goals:   consistent, controlled resting BP < 130/80 and medication compliance    Patient Specific CORE COMPONENT GOALS:    Attend CR regularly  Start an exercise program  Control DM with diet medication and exercise      INDIVIDUALIZED TREATMENT PLAN      EXERCISE GOALS and PLAN      Progress toward Exercise goals:   Reviewed Pt goals and determined plan of care    Exercise Plan:    patient will attend rehab 2-3 times per week to complete 36 exercise sessions  progress workloads as tolerated to maintain RPE 4-6/10  patient will add home exercise 30-45 mins 1-2 days to supplement cardiac rehab  introduce resistance training in rehab session  patient will add home walking increasing distance as tolerated  Patient education handout:   Exercise After Cardiac Rehabilitation  After discharge patient will continue to follow a regular exercise regimen with the goal of a minimum of 150 minutes per week of moderate intensity exercise and progress as tolerated.     The patient was counseled on exercise guidelines to achieve a minimum of 150 mins/wk of moderate intensity (RPE 4-6)   exercise and encouraged to add 1-2 days of exercise on opposite days of cardiac rehab as tolerated.       PHYSICIAN PRESCRIBED EXERCISE:    Current Aerobic Exercise Prescription:      Frequency: 2 days/week   Supplement with home exercise 2+ days/wk as tolerated       Minutes: 30 - 40         METS: 2.5-5.5            HR: Intensity based on RPE 4-6    RPE: 4-6         Modalities: Treadmill, Airdyne bike, UBE, Lifecycle, NuStep, and Recumbent bike     Exercise workloads will be progressed gradually as tolerated, within limits of patient's ability, and according to the patient's   response to the exercise program.      Aerobic Exercise Prescription Plan for Progression   Frequency: 3 days/week of cardiac rehab       Supplement with home exercise 2+ days/wk  as tolerated    Minutes: 40       >150 mins/wk of moderate intensity exercise   METS: 2.5-5.5   HR: Intensity based on RPE 4-6      RPE: 4-6   Modalities: Treadmill, Airdyne bike, UBE, Lifecycle, NuStep, and Recumbent bike    Strength trainin-3 days / week  12-15 repetitions  1-2 sets per modality   Will be added following 2-3 weeks of monitored exercise sessions   Modalities: Arm Curl, Sit to Stands, Front Raises, and Shoulder Shrugs    Home Exercise: none    Exercise Education: benefit of exercise for CAD risk factors, home exercise guidelines, AHA guidelines to achieve >150 mins/wk of moderate exercise, RPE scale, and class: Risk Factors for Heart Disease     Readiness to change: Preparation:  (Getting ready to change)       NUTRITION GOALS AND PLAN      Nutritional   Reviewed patient's Rate your Plate. Discussed key elements of heart healthy eating. Reviewed patient goals for dietary modifications and their clinical implications.  Reviewed most recent lipid profile.     Patient's progress toward Nutrition goals:    Reviewed Pt goals and determined plan of care, Goals met: 2025.      Nutrition Plan:   group class: Reading Food Labels  Made dietary changes, reduced food portions, follows low sodium, low fat, low carbohydrate diet    Measurable goals were based Rate Your Plate Dietary Self-Assessment. These are the areas in which the patient could score higher on the assessment.  Goals include recommendations for a heart healthy diet based on American Heart Association.    Nutrition Education:   low sodium diet  class: Heart Healthy Eating    Readiness to change: Action:  (Changing behavior)      PSYCHOSOCIAL GOALS AND PLAN    Psychosocial Assessment as it relates to rehabilitation:   Patient denies issues with his/her family or home life that may affect their rehabilitation efforts.     Patient's progress toward Psychosocial goals:    Reviewed Pt goals and determined plan of care, Goals met:  6/17/2025.    Psychosocial Plan:   Class: Stress and Your Health, exercise, and keep a positive mindset    Psychosocial Education: benefits of enrolling in [facility]  depression and heart disease    Information to utilize [employer] was provided as well as contact information for counseling through  [facility] and group psychotherapy groups available.    Readiness to change: Maintenance: (Maintaining the behavior change)      OTHER CORE COMPONENTS GOALS and PLAN      Blood Pressure will be monitored throughout the program and cardiologist will be notified of elevated trends.    Pt will be encouraged to monitor home BP if advised by cardiologist.    Tobacco Plan:   N/A:  Pt is a non-smoker    Progress toward Core Component goals:   Goals met: 6/17/2025 non smoker.    Other Core Components Plan:   medication compliance  check labels for sodium content  engage in regular exercise for BP control  class: Understanding Heart Disease    Group and Individual Education:  impact of exercise on glycemic control and plant based eating for lipid management    Readiness to change: Preparation:  (Getting ready to change)        [1]   Past Medical History:  Diagnosis Date    History of low back pain     Osteoarthritis     Tinea corporis    [2] No family history on file.  [3]   Current Outpatient Medications   Medication Sig Dispense Refill    aspirin 81 mg chewable tablet Chew 1 tablet (81 mg total) daily      atorvastatin (LIPITOR) 40 mg tablet Take 1 tablet (40 mg total) by mouth daily with dinner 90 tablet 1    Blood Glucose Monitoring Suppl (Contour Next One) KIT Use one test strip once daily [E11.29] 1 kit 1    Empagliflozin 25 MG TABS Take 1 tablet (25 mg total) by mouth every morning 90 tablet 3    glucose blood (Contour Next Test) test strip Use one test strip once daily [E11.29] 100 strip 3    Lancets (onetouch ultrasoft) lancets Use one test strip once daily [E11.29] 100 each 3    lisinopril (ZESTRIL) 10 mg  tablet Take 1 tablet (10 mg total) by mouth daily 100 tablet 2    metoprolol succinate (TOPROL-XL) 25 mg 24 hr tablet Take 1 tablet (25 mg total) by mouth daily at bedtime 90 tablet 3    pantoprazole (PROTONIX) 40 mg tablet Take 1 tablet (40 mg total) by mouth daily in the early morning Do not start before May 17, 2025. 90 tablet 1    ticagrelor (BRILINTA) 90 MG Take 1 tablet (90 mg total) by mouth every 12 (twelve) hours 180 tablet 3     No current facility-administered medications for this visit.

## 2025-06-30 ENCOUNTER — APPOINTMENT (OUTPATIENT)
Dept: CARDIAC REHAB | Facility: CLINIC | Age: 65
End: 2025-06-30
Attending: NURSE PRACTITIONER
Payer: MEDICARE

## (undated) DEVICE — RADIFOCUS OPTITORQUE ANGIOGRAPHIC CATHETER: Brand: OPTITORQUE

## (undated) DEVICE — TR BAND RADIAL ARTERY COMPRESSION DEVICE: Brand: TR BAND

## (undated) DEVICE — Device

## (undated) DEVICE — MANIFOLD KIT NETWORK - CCL

## (undated) DEVICE — CATH GUIDE LAUNCHER 6FR EBU 3.5

## (undated) DEVICE — BALLOON EUPHORA RX 3 X 15MM

## (undated) DEVICE — GLIDESHEATH BASIC HYDROPHILIC COATED INTRODUCER SHEATH: Brand: GLIDESHEATH

## (undated) DEVICE — RUNTHROUGH NS EXTRA FLOPPY PTCA GUIDEWIRE: Brand: RUNTHROUGH

## (undated) DEVICE — NEEDLE PERCUTANEOUS 21G X 4CM

## (undated) DEVICE — DGW .035 FC J3MM 150CM TEF: Brand: EMERALD

## (undated) DEVICE — GUIDEWIRE .035 260CM 3MM J TIP

## (undated) DEVICE — PACK CUSTOM C V DRAPE SCV11CDSLA